# Patient Record
Sex: FEMALE | Race: WHITE | NOT HISPANIC OR LATINO | URBAN - METROPOLITAN AREA
[De-identification: names, ages, dates, MRNs, and addresses within clinical notes are randomized per-mention and may not be internally consistent; named-entity substitution may affect disease eponyms.]

---

## 2019-01-28 ENCOUNTER — AMBULATORY - RIVER FALLS (OUTPATIENT)
Dept: FAMILY MEDICINE | Facility: CLINIC | Age: 69
End: 2019-01-28

## 2019-01-28 ENCOUNTER — COMMUNICATION - RIVER FALLS (OUTPATIENT)
Dept: FAMILY MEDICINE | Facility: CLINIC | Age: 69
End: 2019-01-28

## 2019-01-28 ENCOUNTER — OFFICE VISIT - RIVER FALLS (OUTPATIENT)
Dept: FAMILY MEDICINE | Facility: CLINIC | Age: 69
End: 2019-01-28

## 2019-01-28 LAB
INR PPP: 4.7
INR PPP: 4.7
PROTHROMBIN TIME: 43.5 S (ref 11.9–13.9)

## 2019-01-28 ASSESSMENT — MIFFLIN-ST. JEOR: SCORE: 1010.29

## 2019-01-29 ENCOUNTER — AMBULATORY - RIVER FALLS (OUTPATIENT)
Dept: FAMILY MEDICINE | Facility: CLINIC | Age: 69
End: 2019-01-29

## 2019-01-29 LAB
INR PPP: 3.2
PROTHROMBIN TIME: 32.1 S (ref 11.9–13.9)

## 2019-02-01 ENCOUNTER — AMBULATORY - RIVER FALLS (OUTPATIENT)
Dept: FAMILY MEDICINE | Facility: CLINIC | Age: 69
End: 2019-02-01

## 2019-02-01 LAB
INR PPP: 2.6
PROTHROMBIN TIME: 27.4 S (ref 11.9–13.9)

## 2019-02-05 ENCOUNTER — AMBULATORY - RIVER FALLS (OUTPATIENT)
Dept: FAMILY MEDICINE | Facility: CLINIC | Age: 69
End: 2019-02-05

## 2019-02-05 LAB — INR PPP: 2.6

## 2019-02-08 ENCOUNTER — AMBULATORY - RIVER FALLS (OUTPATIENT)
Dept: FAMILY MEDICINE | Facility: CLINIC | Age: 69
End: 2019-02-08

## 2019-02-08 LAB — INR PPP: 2.6

## 2019-02-21 ENCOUNTER — AMBULATORY - RIVER FALLS (OUTPATIENT)
Dept: FAMILY MEDICINE | Facility: CLINIC | Age: 69
End: 2019-02-21

## 2019-02-22 ENCOUNTER — AMBULATORY - RIVER FALLS (OUTPATIENT)
Dept: FAMILY MEDICINE | Facility: CLINIC | Age: 69
End: 2019-02-22

## 2019-02-22 LAB — INR PPP: 2

## 2019-02-23 ENCOUNTER — AMBULATORY - RIVER FALLS (OUTPATIENT)
Dept: FAMILY MEDICINE | Facility: CLINIC | Age: 69
End: 2019-02-23

## 2019-03-14 ENCOUNTER — OFFICE VISIT - RIVER FALLS (OUTPATIENT)
Dept: FAMILY MEDICINE | Facility: CLINIC | Age: 69
End: 2019-03-14

## 2019-03-14 LAB
INR PPP: 1.7
PROTHROMBIN TIME: 19.3 S (ref 11.9–13.9)

## 2019-03-14 ASSESSMENT — MIFFLIN-ST. JEOR: SCORE: 1015.73

## 2019-04-04 ENCOUNTER — AMBULATORY - RIVER FALLS (OUTPATIENT)
Dept: FAMILY MEDICINE | Facility: CLINIC | Age: 69
End: 2019-04-04

## 2019-04-04 LAB — INR PPP: 1.4

## 2019-04-18 ENCOUNTER — OFFICE VISIT - RIVER FALLS (OUTPATIENT)
Dept: FAMILY MEDICINE | Facility: CLINIC | Age: 69
End: 2019-04-18

## 2019-04-18 ASSESSMENT — MIFFLIN-ST. JEOR: SCORE: 1016.64

## 2019-05-01 ENCOUNTER — AMBULATORY - HEALTHEAST (OUTPATIENT)
Dept: CARDIOLOGY | Facility: CLINIC | Age: 69
End: 2019-05-01

## 2019-05-01 ENCOUNTER — RECORDS - HEALTHEAST (OUTPATIENT)
Dept: ADMINISTRATIVE | Facility: OTHER | Age: 69
End: 2019-05-01

## 2019-05-21 ENCOUNTER — AMBULATORY - RIVER FALLS (OUTPATIENT)
Dept: FAMILY MEDICINE | Facility: CLINIC | Age: 69
End: 2019-05-21

## 2019-05-21 LAB — INR PPP: 1.1

## 2019-05-23 ENCOUNTER — OFFICE VISIT - RIVER FALLS (OUTPATIENT)
Dept: FAMILY MEDICINE | Facility: CLINIC | Age: 69
End: 2019-05-23

## 2019-05-23 ENCOUNTER — AMBULATORY - RIVER FALLS (OUTPATIENT)
Dept: FAMILY MEDICINE | Facility: CLINIC | Age: 69
End: 2019-05-23

## 2019-05-24 LAB — FECAL FAT, QUALITATIVE: NORMAL

## 2019-05-25 ENCOUNTER — COMMUNICATION - RIVER FALLS (OUTPATIENT)
Dept: FAMILY MEDICINE | Facility: CLINIC | Age: 69
End: 2019-05-25

## 2019-06-06 ENCOUNTER — AMBULATORY - RIVER FALLS (OUTPATIENT)
Dept: FAMILY MEDICINE | Facility: CLINIC | Age: 69
End: 2019-06-06

## 2019-06-06 LAB — INR PPP: 2.1

## 2019-06-21 ENCOUNTER — AMBULATORY - RIVER FALLS (OUTPATIENT)
Dept: FAMILY MEDICINE | Facility: CLINIC | Age: 69
End: 2019-06-21

## 2019-06-21 LAB — INR PPP: 2

## 2019-07-05 ENCOUNTER — AMBULATORY - RIVER FALLS (OUTPATIENT)
Dept: FAMILY MEDICINE | Facility: CLINIC | Age: 69
End: 2019-07-05

## 2019-07-05 LAB — INR PPP: 2.4

## 2019-08-01 ENCOUNTER — AMBULATORY - RIVER FALLS (OUTPATIENT)
Dept: FAMILY MEDICINE | Facility: CLINIC | Age: 69
End: 2019-08-01

## 2019-08-01 LAB — INR PPP: 2.3

## 2019-08-30 ENCOUNTER — AMBULATORY - RIVER FALLS (OUTPATIENT)
Dept: FAMILY MEDICINE | Facility: CLINIC | Age: 69
End: 2019-08-30

## 2019-08-30 LAB
INR PPP: 1.1
PROTHROMBIN TIME: 14.4 S (ref 11.8–14.2)

## 2019-08-31 LAB
CHOLEST SERPL-MCNC: 175 MG/DL
CHOLEST/HDLC SERPL: 2.5 {RATIO}
GLUCOSE BLD-MCNC: 79 MG/DL (ref 65–99)
HDLC SERPL-MCNC: 70 MG/DL
LDLC SERPL CALC-MCNC: 90 MG/DL
NONHDLC SERPL-MCNC: 105 MG/DL
TRIGL SERPL-MCNC: 61 MG/DL

## 2019-09-04 ENCOUNTER — COMMUNICATION - RIVER FALLS (OUTPATIENT)
Dept: FAMILY MEDICINE | Facility: CLINIC | Age: 69
End: 2019-09-04

## 2019-09-06 ENCOUNTER — AMBULATORY - RIVER FALLS (OUTPATIENT)
Dept: FAMILY MEDICINE | Facility: CLINIC | Age: 69
End: 2019-09-06

## 2019-09-06 LAB — INR PPP: 2.6

## 2019-10-02 ENCOUNTER — OFFICE VISIT - RIVER FALLS (OUTPATIENT)
Dept: FAMILY MEDICINE | Facility: CLINIC | Age: 69
End: 2019-10-02

## 2019-10-02 ENCOUNTER — AMBULATORY - RIVER FALLS (OUTPATIENT)
Dept: FAMILY MEDICINE | Facility: CLINIC | Age: 69
End: 2019-10-02

## 2019-10-02 LAB — INR PPP: 3.6

## 2019-10-17 ENCOUNTER — AMBULATORY - RIVER FALLS (OUTPATIENT)
Dept: FAMILY MEDICINE | Facility: CLINIC | Age: 69
End: 2019-10-17

## 2019-10-17 LAB — INR PPP: 1.8

## 2019-10-31 ENCOUNTER — AMBULATORY - RIVER FALLS (OUTPATIENT)
Dept: FAMILY MEDICINE | Facility: CLINIC | Age: 69
End: 2019-10-31

## 2019-10-31 LAB — INR PPP: 2

## 2019-11-26 ENCOUNTER — AMBULATORY - RIVER FALLS (OUTPATIENT)
Dept: FAMILY MEDICINE | Facility: CLINIC | Age: 69
End: 2019-11-26

## 2019-11-26 LAB — INR PPP: 2.5

## 2019-12-23 ENCOUNTER — AMBULATORY - RIVER FALLS (OUTPATIENT)
Dept: FAMILY MEDICINE | Facility: CLINIC | Age: 69
End: 2019-12-23

## 2019-12-23 LAB — INR PPP: 3.6

## 2019-12-26 ENCOUNTER — OFFICE VISIT - RIVER FALLS (OUTPATIENT)
Dept: FAMILY MEDICINE | Facility: CLINIC | Age: 69
End: 2019-12-26

## 2019-12-26 LAB
ANION GAP SERPL CALCULATED.3IONS-SCNC: 8 MMOL/L (ref 5–18)
BASOPHILS # BLD MANUAL: 0 10*3/UL
BASOPHILS NFR BLD MANUAL: 0.3 %
BNP SERPL-MCNC: 246 PG/ML
BUN SERPL-MCNC: 12 MG/DL (ref 8–25)
BUN/CREAT RATIO - HISTORICAL: 16 (ref 10–20)
CALCIUM SERPL-MCNC: 9.1 MG/DL (ref 8.5–10.5)
CHLORIDE BLD-SCNC: 107 MMOL/L (ref 98–110)
CO2 SERPL-SCNC: 26 MMOL/L (ref 21–31)
CREAT SERPL-MCNC: 0.76 MG/DL (ref 0.57–1.11)
EOSINOPHIL # BLD MANUAL: 0.2 10*3/UL
EOSINOPHIL NFR BLD MANUAL: 2.8 %
ERYTHROCYTE [DISTWIDTH] IN BLOOD BY AUTOMATED COUNT: 13.8 % (ref 11.5–15.5)
GFR ESTIMATE EXT - HISTORICAL: >60
GLUCOSE BLD-MCNC: 90 MG/DL (ref 65–100)
HCT VFR BLD AUTO: 40.9 % (ref 33–51)
HGB BLD-MCNC: 13.2 G/DL (ref 12–16)
LYMPHOCYTES # BLD MANUAL: 1.4 10*3/UL (ref 0.9–2.9)
LYMPHOCYTES NFR BLD MANUAL: 20.4 %
MCH RBC QN AUTO: 29.5 PG (ref 26–34)
MCHC RBC AUTO-ENTMCNC: 32.3 G/DL (ref 32–36)
MCV RBC AUTO: 92 FL (ref 80–100)
MONOCYTES # BLD MANUAL: 0.5 10*3/UL
MONOCYTES NFR BLD MANUAL: 7.9 %
NEUTROPHILS # BLD MANUAL: 4.7 10*3/UL (ref 1.7–7)
NEUTROPHILS NFR BLD MANUAL: 68.6 %
PLATELET # BLD AUTO: 270 10*3/UL (ref 140–440)
PMV BLD: 9.3 FL (ref 6.5–11)
POTASSIUM BLD-SCNC: 4.4 MMOL/L (ref 3.5–5)
RBC # BLD AUTO: 4.47 10*6/UL (ref 4–5.2)
SODIUM SERPL-SCNC: 141 MMOL/L (ref 135–145)
TROPONIN I - HISTORICAL: <0.01 NG/ML
WBC # BLD AUTO: 6.8 10*3/UL (ref 4.5–11)

## 2019-12-31 LAB
CREAT SERPL-MCNC: 0.66 MG/DL
HGB BLD-MCNC: 13.2 G/DL
INR PPP: 1.5
POTASSIUM BLD-SCNC: 4.2 MEQ/L
SODIUM SERPL-SCNC: 140 MEQ/L
WBC # BLD AUTO: 6.8 X10

## 2020-01-02 ENCOUNTER — OFFICE VISIT - RIVER FALLS (OUTPATIENT)
Dept: FAMILY MEDICINE | Facility: CLINIC | Age: 70
End: 2020-01-02

## 2020-01-02 ASSESSMENT — MIFFLIN-ST. JEOR: SCORE: 985.79

## 2020-01-04 LAB
BUN SERPL-MCNC: 29 MG/DL (ref 7–25)
BUN/CREAT RATIO - HISTORICAL: 26 (ref 6–22)
CALCIUM SERPL-MCNC: 9.3 MG/DL (ref 8.6–10.4)
CHLORIDE BLD-SCNC: 96 MMOL/L (ref 98–110)
CO2 SERPL-SCNC: 28 MMOL/L (ref 20–32)
CREAT SERPL-MCNC: 1.11 MG/DL (ref 0.6–0.93)
EGFRCR SERPLBLD CKD-EPI 2021: 50 ML/MIN/1.73M2
GLUCOSE BLD-MCNC: 109 MG/DL (ref 65–99)
POTASSIUM BLD-SCNC: 4.6 MMOL/L (ref 3.5–5.3)
SODIUM SERPL-SCNC: 137 MMOL/L (ref 135–146)

## 2020-01-06 ENCOUNTER — AMBULATORY - RIVER FALLS (OUTPATIENT)
Dept: FAMILY MEDICINE | Facility: CLINIC | Age: 70
End: 2020-01-06

## 2020-01-06 ENCOUNTER — COMMUNICATION - RIVER FALLS (OUTPATIENT)
Dept: FAMILY MEDICINE | Facility: CLINIC | Age: 70
End: 2020-01-06

## 2020-01-06 LAB
INR PPP: 4.7
PROTHROMBIN TIME: 43.9 S (ref 11.8–14.2)

## 2020-01-10 ENCOUNTER — OFFICE VISIT - RIVER FALLS (OUTPATIENT)
Dept: FAMILY MEDICINE | Facility: CLINIC | Age: 70
End: 2020-01-10

## 2020-01-10 ENCOUNTER — COMMUNICATION - RIVER FALLS (OUTPATIENT)
Dept: FAMILY MEDICINE | Facility: CLINIC | Age: 70
End: 2020-01-10

## 2020-01-14 ENCOUNTER — AMBULATORY - RIVER FALLS (OUTPATIENT)
Dept: FAMILY MEDICINE | Facility: CLINIC | Age: 70
End: 2020-01-14

## 2020-02-07 ENCOUNTER — OFFICE VISIT - RIVER FALLS (OUTPATIENT)
Dept: FAMILY MEDICINE | Facility: CLINIC | Age: 70
End: 2020-02-07

## 2020-02-07 LAB
ANION GAP SERPL CALCULATED.3IONS-SCNC: 8 MMOL/L (ref 5–18)
BUN SERPL-MCNC: 17 MG/DL (ref 8–25)
BUN/CREAT RATIO - HISTORICAL: 13 (ref 10–20)
CALCIUM SERPL-MCNC: 9.1 MG/DL (ref 8.5–10.5)
CHLORIDE BLD-SCNC: 107 MMOL/L (ref 98–110)
CO2 SERPL-SCNC: 28 MMOL/L (ref 21–31)
CREAT SERPL-MCNC: 1.29 MG/DL (ref 0.57–1.11)
ERYTHROCYTE [DISTWIDTH] IN BLOOD BY AUTOMATED COUNT: 15.2 % (ref 11.5–15.5)
GFR ESTIMATE EXT - HISTORICAL: 41
GLUCOSE BLD-MCNC: 99 MG/DL (ref 65–100)
HCT VFR BLD AUTO: 37.9 % (ref 33–51)
HGB BLD-MCNC: 12 G/DL (ref 12–16)
MCH RBC QN AUTO: 29.9 PG (ref 26–34)
MCHC RBC AUTO-ENTMCNC: 31.7 G/DL (ref 32–36)
MCV RBC AUTO: 94 FL (ref 80–100)
PLATELET # BLD AUTO: 533 10*3/UL (ref 140–440)
PMV BLD: 8.6 FL (ref 6.5–11)
POTASSIUM BLD-SCNC: 4.3 MMOL/L (ref 3.5–5)
RBC # BLD AUTO: 4.02 10*6/UL (ref 4–5.2)
SODIUM SERPL-SCNC: 143 MMOL/L (ref 135–145)
WBC # BLD AUTO: 9.3 10*3/UL (ref 4.5–11)

## 2020-02-07 ASSESSMENT — MIFFLIN-ST. JEOR: SCORE: 1016.64

## 2020-02-13 ENCOUNTER — OFFICE VISIT - RIVER FALLS (OUTPATIENT)
Dept: FAMILY MEDICINE | Facility: CLINIC | Age: 70
End: 2020-02-13

## 2020-02-13 ENCOUNTER — COMMUNICATION - RIVER FALLS (OUTPATIENT)
Dept: FAMILY MEDICINE | Facility: CLINIC | Age: 70
End: 2020-02-13

## 2020-02-13 ASSESSMENT — MIFFLIN-ST. JEOR: SCORE: 996.68

## 2020-05-04 ENCOUNTER — OFFICE VISIT - RIVER FALLS (OUTPATIENT)
Dept: FAMILY MEDICINE | Facility: CLINIC | Age: 70
End: 2020-05-04

## 2020-06-08 ENCOUNTER — OFFICE VISIT - RIVER FALLS (OUTPATIENT)
Dept: FAMILY MEDICINE | Facility: CLINIC | Age: 70
End: 2020-06-08

## 2020-06-08 ASSESSMENT — MIFFLIN-ST. JEOR: SCORE: 1030.25

## 2020-08-04 ENCOUNTER — OFFICE VISIT - RIVER FALLS (OUTPATIENT)
Dept: FAMILY MEDICINE | Facility: CLINIC | Age: 70
End: 2020-08-04

## 2020-08-04 ASSESSMENT — MIFFLIN-ST. JEOR: SCORE: 1078.33

## 2020-08-07 ENCOUNTER — AMBULATORY - RIVER FALLS (OUTPATIENT)
Dept: FAMILY MEDICINE | Facility: CLINIC | Age: 70
End: 2020-08-07

## 2020-08-08 LAB — FECAL FAT, QUALITATIVE: NORMAL

## 2020-08-10 ENCOUNTER — COMMUNICATION - RIVER FALLS (OUTPATIENT)
Dept: FAMILY MEDICINE | Facility: CLINIC | Age: 70
End: 2020-08-10

## 2020-10-30 ENCOUNTER — AMBULATORY - RIVER FALLS (OUTPATIENT)
Dept: FAMILY MEDICINE | Facility: CLINIC | Age: 70
End: 2020-10-30

## 2020-10-30 LAB
INR PPP: 1.5
PROTHROMBIN TIME: 17.6 S (ref 10.5–13.1)

## 2020-11-02 ENCOUNTER — AMBULATORY - RIVER FALLS (OUTPATIENT)
Dept: FAMILY MEDICINE | Facility: CLINIC | Age: 70
End: 2020-11-02

## 2020-11-06 ENCOUNTER — AMBULATORY - RIVER FALLS (OUTPATIENT)
Dept: FAMILY MEDICINE | Facility: CLINIC | Age: 70
End: 2020-11-06

## 2020-11-06 LAB
INR PPP: 2.1
INR PPP: 2.1
PROTHROMBIN TIME: 25.6 S (ref 10.5–13.1)

## 2020-11-09 LAB
INR PPP: 2.5
PROTHROMBIN TIME: 29.7 S (ref 10.5–13.1)

## 2020-11-12 ENCOUNTER — AMBULATORY - RIVER FALLS (OUTPATIENT)
Dept: FAMILY MEDICINE | Facility: CLINIC | Age: 70
End: 2020-11-12

## 2020-11-12 LAB
INR PPP: 3.9
PROTHROMBIN TIME: 47.2 S (ref 10.5–13.1)

## 2020-11-17 ENCOUNTER — AMBULATORY - RIVER FALLS (OUTPATIENT)
Dept: FAMILY MEDICINE | Facility: CLINIC | Age: 70
End: 2020-11-17

## 2020-11-17 ENCOUNTER — COMMUNICATION - RIVER FALLS (OUTPATIENT)
Dept: FAMILY MEDICINE | Facility: CLINIC | Age: 70
End: 2020-11-17

## 2020-11-24 ENCOUNTER — AMBULATORY - RIVER FALLS (OUTPATIENT)
Dept: FAMILY MEDICINE | Facility: CLINIC | Age: 70
End: 2020-11-24

## 2020-11-24 LAB
INR PPP: 2.6
PROTHROMBIN TIME: 31.7 S (ref 10.5–13.1)

## 2020-12-10 ENCOUNTER — AMBULATORY - RIVER FALLS (OUTPATIENT)
Dept: FAMILY MEDICINE | Facility: CLINIC | Age: 70
End: 2020-12-10

## 2020-12-10 LAB
INR PPP: 1.9
PROTHROMBIN TIME: 23.4 S (ref 10.5–13.1)

## 2020-12-15 ENCOUNTER — AMBULATORY - RIVER FALLS (OUTPATIENT)
Dept: FAMILY MEDICINE | Facility: CLINIC | Age: 70
End: 2020-12-15

## 2020-12-15 LAB
INR PPP: 2.3
PROTHROMBIN TIME: 27.3 S (ref 10.5–13.1)

## 2020-12-28 ENCOUNTER — AMBULATORY - RIVER FALLS (OUTPATIENT)
Dept: FAMILY MEDICINE | Facility: CLINIC | Age: 70
End: 2020-12-28

## 2020-12-28 LAB
INR PPP: 2
PROTHROMBIN TIME: 23.7 S (ref 10.5–13.1)

## 2021-02-17 ENCOUNTER — COMMUNICATION - RIVER FALLS (OUTPATIENT)
Dept: FAMILY MEDICINE | Facility: CLINIC | Age: 71
End: 2021-02-17

## 2021-02-26 ENCOUNTER — AMBULATORY - RIVER FALLS (OUTPATIENT)
Dept: FAMILY MEDICINE | Facility: CLINIC | Age: 71
End: 2021-02-26

## 2021-02-26 LAB
INR PPP: 2.1
INR PPP: 2.5
PROTHROMBIN TIME: 23.9 S (ref 12–14.6)

## 2021-02-27 LAB
BUN SERPL-MCNC: 18 MG/DL (ref 7–25)
BUN/CREAT RATIO - HISTORICAL: 18 (ref 6–22)
CALCIUM SERPL-MCNC: 9.5 MG/DL (ref 8.6–10.4)
CHLORIDE BLD-SCNC: 104 MMOL/L (ref 98–110)
CHOLEST SERPL-MCNC: 175 MG/DL
CHOLEST/HDLC SERPL: 2.3 {RATIO}
CO2 SERPL-SCNC: 29 MMOL/L (ref 20–32)
CREAT SERPL-MCNC: 0.99 MG/DL (ref 0.6–0.93)
EGFRCR SERPLBLD CKD-EPI 2021: 57 ML/MIN/1.73M2
ERYTHROCYTE [DISTWIDTH] IN BLOOD BY AUTOMATED COUNT: 12.4 % (ref 11–15)
GLUCOSE BLD-MCNC: 92 MG/DL (ref 65–99)
HCT VFR BLD AUTO: 33.5 % (ref 35–45)
HDLC SERPL-MCNC: 75 MG/DL
HGB BLD-MCNC: 11.2 GM/DL (ref 11.7–15.5)
LDLC SERPL CALC-MCNC: 80 MG/DL
MCH RBC QN AUTO: 30 PG (ref 27–33)
MCHC RBC AUTO-ENTMCNC: 33.4 GM/DL (ref 32–36)
MCV RBC AUTO: 89.8 FL (ref 80–100)
NONHDLC SERPL-MCNC: 100 MG/DL
PLATELET # BLD AUTO: 234 10*3/UL (ref 140–400)
PMV BLD: 9.6 FL (ref 7.5–12.5)
POTASSIUM BLD-SCNC: 4.2 MMOL/L (ref 3.5–5.3)
RBC # BLD AUTO: 3.73 10*6/UL (ref 3.8–5.1)
SODIUM SERPL-SCNC: 141 MMOL/L (ref 135–146)
TRIGL SERPL-MCNC: 102 MG/DL
WBC # BLD AUTO: 6.7 10*3/UL (ref 3.8–10.8)

## 2021-02-28 ENCOUNTER — COMMUNICATION - RIVER FALLS (OUTPATIENT)
Dept: FAMILY MEDICINE | Facility: CLINIC | Age: 71
End: 2021-02-28

## 2021-03-11 ENCOUNTER — AMBULATORY - RIVER FALLS (OUTPATIENT)
Dept: FAMILY MEDICINE | Facility: CLINIC | Age: 71
End: 2021-03-11

## 2021-03-11 LAB
INR PPP: 1.9
PROTHROMBIN TIME: 23.3 S (ref 10.5–13.1)

## 2021-03-29 ENCOUNTER — AMBULATORY - RIVER FALLS (OUTPATIENT)
Dept: FAMILY MEDICINE | Facility: CLINIC | Age: 71
End: 2021-03-29

## 2021-03-29 LAB — INR BLD: 1.8

## 2021-04-21 ENCOUNTER — AMBULATORY - RIVER FALLS (OUTPATIENT)
Dept: FAMILY MEDICINE | Facility: CLINIC | Age: 71
End: 2021-04-21

## 2021-04-21 LAB — INR BLD: 1.9

## 2021-04-27 ENCOUNTER — OFFICE VISIT - RIVER FALLS (OUTPATIENT)
Dept: FAMILY MEDICINE | Facility: CLINIC | Age: 71
End: 2021-04-27

## 2021-04-27 ASSESSMENT — MIFFLIN-ST. JEOR: SCORE: 1079.69

## 2021-05-06 ENCOUNTER — AMBULATORY - RIVER FALLS (OUTPATIENT)
Dept: FAMILY MEDICINE | Facility: CLINIC | Age: 71
End: 2021-05-06

## 2021-05-06 LAB — INR BLD: 1.9

## 2021-05-20 ENCOUNTER — AMBULATORY - RIVER FALLS (OUTPATIENT)
Dept: FAMILY MEDICINE | Facility: CLINIC | Age: 71
End: 2021-05-20

## 2021-05-20 LAB — INR BLD: 2.9

## 2021-07-01 ENCOUNTER — AMBULATORY - RIVER FALLS (OUTPATIENT)
Dept: FAMILY MEDICINE | Facility: CLINIC | Age: 71
End: 2021-07-01

## 2021-07-01 LAB — INR BLD: 4.2

## 2021-07-07 ENCOUNTER — AMBULATORY - RIVER FALLS (OUTPATIENT)
Dept: FAMILY MEDICINE | Facility: CLINIC | Age: 71
End: 2021-07-07

## 2021-07-07 LAB — INR BLD: 1.7

## 2021-08-18 ENCOUNTER — OFFICE VISIT - RIVER FALLS (OUTPATIENT)
Dept: FAMILY MEDICINE | Facility: CLINIC | Age: 71
End: 2021-08-18

## 2021-08-18 LAB — INR BLD: 3

## 2021-09-08 ENCOUNTER — OFFICE VISIT - RIVER FALLS (OUTPATIENT)
Dept: FAMILY MEDICINE | Facility: CLINIC | Age: 71
End: 2021-09-08

## 2021-09-08 LAB — INR BLD: 3.1

## 2022-02-11 VITALS
WEIGHT: 135.9 LBS | OXYGEN SATURATION: 99 % | TEMPERATURE: 97.5 F | BODY MASS INDEX: 25.01 KG/M2 | HEIGHT: 62 IN | HEART RATE: 74 BPM | DIASTOLIC BLOOD PRESSURE: 82 MMHG | SYSTOLIC BLOOD PRESSURE: 126 MMHG

## 2022-02-12 VITALS
HEIGHT: 62 IN | BODY MASS INDEX: 23 KG/M2 | DIASTOLIC BLOOD PRESSURE: 68 MMHG | HEART RATE: 62 BPM | WEIGHT: 125 LBS | HEIGHT: 62 IN | OXYGEN SATURATION: 97 % | SYSTOLIC BLOOD PRESSURE: 128 MMHG | WEIGHT: 135.6 LBS | OXYGEN SATURATION: 96 % | TEMPERATURE: 98.6 F | BODY MASS INDEX: 24.95 KG/M2 | HEART RATE: 70 BPM | TEMPERATURE: 98 F | SYSTOLIC BLOOD PRESSURE: 104 MMHG | DIASTOLIC BLOOD PRESSURE: 80 MMHG

## 2022-02-12 VITALS
SYSTOLIC BLOOD PRESSURE: 130 MMHG | DIASTOLIC BLOOD PRESSURE: 74 MMHG | SYSTOLIC BLOOD PRESSURE: 136 MMHG | BODY MASS INDEX: 22.45 KG/M2 | DIASTOLIC BLOOD PRESSURE: 88 MMHG | WEIGHT: 122 LBS | DIASTOLIC BLOOD PRESSURE: 76 MMHG | WEIGHT: 121.8 LBS | HEART RATE: 62 BPM | BODY MASS INDEX: 22.41 KG/M2 | SYSTOLIC BLOOD PRESSURE: 136 MMHG | HEART RATE: 64 BPM | TEMPERATURE: 98.7 F | HEIGHT: 62 IN | HEIGHT: 62 IN | TEMPERATURE: 97.3 F

## 2022-02-12 VITALS
BODY MASS INDEX: 23.45 KG/M2 | DIASTOLIC BLOOD PRESSURE: 68 MMHG | SYSTOLIC BLOOD PRESSURE: 106 MMHG | HEIGHT: 62 IN | HEART RATE: 83 BPM | HEART RATE: 96 BPM | TEMPERATURE: 100.4 F | HEART RATE: 86 BPM | SYSTOLIC BLOOD PRESSURE: 98 MMHG | OXYGEN SATURATION: 95 % | DIASTOLIC BLOOD PRESSURE: 60 MMHG | TEMPERATURE: 99 F | TEMPERATURE: 99.5 F | WEIGHT: 128.2 LBS | OXYGEN SATURATION: 97 % | BODY MASS INDEX: 21.07 KG/M2 | HEART RATE: 80 BPM | HEART RATE: 84 BPM | HEART RATE: 79 BPM | OXYGEN SATURATION: 96 % | HEIGHT: 62 IN | WEIGHT: 117.6 LBS | RESPIRATION RATE: 22 BRPM | OXYGEN SATURATION: 99 % | BODY MASS INDEX: 21.64 KG/M2 | SYSTOLIC BLOOD PRESSURE: 104 MMHG | OXYGEN SATURATION: 95 % | TEMPERATURE: 98.8 F | SYSTOLIC BLOOD PRESSURE: 94 MMHG | WEIGHT: 122 LBS | DIASTOLIC BLOOD PRESSURE: 80 MMHG | SYSTOLIC BLOOD PRESSURE: 112 MMHG | HEIGHT: 62 IN | TEMPERATURE: 99.4 F | BODY MASS INDEX: 21.2 KG/M2 | SYSTOLIC BLOOD PRESSURE: 98 MMHG | WEIGHT: 115.2 LBS | OXYGEN SATURATION: 96 % | HEIGHT: 62 IN | DIASTOLIC BLOOD PRESSURE: 70 MMHG | DIASTOLIC BLOOD PRESSURE: 70 MMHG | RESPIRATION RATE: 24 BRPM | TEMPERATURE: 99.1 F | DIASTOLIC BLOOD PRESSURE: 80 MMHG | DIASTOLIC BLOOD PRESSURE: 58 MMHG | RESPIRATION RATE: 26 BRPM | BODY MASS INDEX: 21.07 KG/M2 | BODY MASS INDEX: 22.45 KG/M2 | SYSTOLIC BLOOD PRESSURE: 106 MMHG | HEIGHT: 62 IN

## 2022-02-12 VITALS
DIASTOLIC BLOOD PRESSURE: 82 MMHG | SYSTOLIC BLOOD PRESSURE: 136 MMHG | DIASTOLIC BLOOD PRESSURE: 74 MMHG | SYSTOLIC BLOOD PRESSURE: 116 MMHG

## 2022-02-12 VITALS — SYSTOLIC BLOOD PRESSURE: 108 MMHG | DIASTOLIC BLOOD PRESSURE: 62 MMHG

## 2022-02-12 VITALS
HEIGHT: 62 IN | BODY MASS INDEX: 22.19 KG/M2 | HEART RATE: 72 BPM | WEIGHT: 120.6 LBS | DIASTOLIC BLOOD PRESSURE: 88 MMHG | SYSTOLIC BLOOD PRESSURE: 128 MMHG | TEMPERATURE: 99.3 F

## 2022-02-15 NOTE — PROGRESS NOTES
History of Present Illness      Called patient to come in today for results of CT scan which was done this morning.  She is here with her sister, Christin.      She is still having some shortness of breath. Not worsening. No chest pain.  No abdominal pain.      CT results from this morning:      Bilateral acute PE with some pulmonary infarction      16cm pelvic mass which appears to be of ovarian or uterine origin.  ascites.      Pt is currently taking warfarin 2.5mg daily.  Four days ago her INR was 4.7  Review of Systems      Negative except as listed in HPI.  Physical Exam      vitals noted.  normal.  O2 sat 97% RA      patient alert, oriented and in no acute distress  Assessment/Plan       Pelvic mass in female (R19.00)         will need GYN consult       Pulmonary embolism, bilateral (I26.99)         currently anticoagulated with warfarin        INR four days ago was 4.7        will need change to lovenox        recommended inpatient treatment - direct admission to [facility].        Accepting hospitalist Dr Viridiana Weathers I spoke with her directly and also gave report to the accepting nurse        declined ambulance transfer.  Sister, Christin, to drive her there  Patient Information     Name:MARNI SANTANA      Address:      [address] [place] [id number]     Sex:Female     YOB: 1950     [phone]     Emergency Contact:ABBIE MENDEZ     MRN:[id number]     FIN:[id number]     Location:[facility]     Date of Service:01/10/2020      Primary Care Physician:       Kirk Agarwal MD, [phone]  Problem List/Past Medical History    Ongoing     Atrial fibrillation     Hypertension     Warfarin anticoagulation    Historical     No qualifying data  Procedure/Surgical History     Thoracentesis - Right pleural effusion (12/26/2019)           Breast implant        Medications    furosemide 40 mg oral tablet, 40 mg= 1 tab(s), Oral, daily,   Not  taking    metoprolol succinate 50 mg oral capsule, extended release, 50 mg= 1 cap(s), Oral, daily    warfarin 5 mg oral tablet, 2.5 mg= 0.5 tab(s), Oral, daily  Allergies    penicillin (bumps on skin)  Social History    Smoking Status - 01/02/2020     Never smoker     Employment/School      Employed, 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 Lives with Self., 01/28/2019     Substance Abuse      Never, 01/28/2019     Tobacco      Never (less than 100 in lifetime), 01/28/2019  Family History    Atrial fibrillation: Brother.    HTN - Hypertension: Father.  Lab Results       Lab Results (Last 4 results within 90 days)        Sodium Level: 137 mmol/L [135 mmol/L - 146 mmol/L] (01/02/20 16:58:00)       Sodium Level: 141 [135 mmol/L - 145 mmol/L] (12/26/19 09:30:00)       Sodium Level TR: 140 mEq/L (12/27/19 06:06:00)       Potassium Level: 4.6 mmol/L [3.5 mmol/L - 5.3 mmol/L] (01/02/20 16:58:00)       Potassium Level: 4.4 [3.5 mmol/L - 5 mmol/L] (12/26/19 09:30:00)       Potassium Level TR: 4.2 mEq/L (12/27/19 06:06:00)       Chloride Level: 96 mmol/L Low [98 mmol/L - 110 mmol/L] (01/02/20 16:58:00)       Chloride Level: 107 [98 mmol/L - 110 mmol/L] (12/26/19 09:30:00)       CO2 Level: 28 mmol/L [20 mmol/L - 32 mmol/L] (01/02/20 16:58:00)       CO2 Level: 26 [21 mmol/L - 31 mmol/L] (12/26/19 09:30:00)       AGAP: 8 [5  - 18] (12/26/19 09:30:00)       Glucose Level: 109 mg/dL High [65 mg/dL - 99 mg/dL] (01/02/20 16:58:00)       Glucose Level: 90 [65 mg/dL - 100 mg/dL] (12/26/19 09:30:00)       BUN: 29 mg/dL High [7 mg/dL - 25 mg/dL] (01/02/20 16:58:00)       BUN: 12 [8 mg/dL - 25 mg/dL] (12/26/19 09:30:00)       Creatinine Level: 1.11 mg/dL High [0.6 mg/dL - 0.93 mg/dL] (01/02/20 16:58:00)       Creatinine Level: 0.76 [0.57 mg/dL - 1.11 mg/dL] (12/26/19 09:30:00)       Creatinine TR: 0.66  mg/dL (12/27/19 06:06:00)       BUN/Creat Ratio: 26 High [6  - 22] (01/02/20 16:58:00)       BUN/Creat Ratio: 16 [10  - 20] (12/26/19 09:30:00)       eGFR: 50 mL/min/1.73m2 Low (01/02/20 16:58:00)       eGFR : 58 mL/min/1.73m2 Low (01/02/20 16:58:00)       eGFR : >60 (12/26/19 09:30:00)       eGFR Non-: >60 (12/26/19 09:30:00)       Calcium Level: 9.3 mg/dL [8.6 mg/dL - 10.4 mg/dL] (01/02/20 16:58:00)       Calcium Level: 9.1 [8.5 mg/dL - 10.5 mg/dL] (12/26/19 09:30:00)       Troponin I: <0.010 (12/26/19 09:30:00)       B-Natriuretic Peptide (BNP): 246 High (12/26/19 09:30:00)       WBC: 6.8 [4.5  - 11] (12/26/19 09:30:00)       WBC TR: 6.8 x10^3/uL (12/26/19 09:30:00)       RBC: 4.47 [4  - 5.2] (12/26/19 09:30:00)       Hgb: 13.2 [12 g/dL - 16 g/dL] (12/26/19 09:30:00)       Hgb TR: 13.2 g/dL (12/26/19 09:30:00)       Hct: 40.9 [33 % - 51 %] (12/26/19 09:30:00)       MCV: 92 [80 fL - 100 fL] (12/26/19 09:30:00)       MCH: 29.5 [26 pg - 34 pg] (12/26/19 09:30:00)       MCHC: 32.3 [32 g/dL - 36 g/dL] (12/26/19 09:30:00)       RDW: 13.8 [11.5 % - 15.5 %] (12/26/19 09:30:00)       Platelet: 270 [140  - 440] (12/26/19 09:30:00)       MPV: 9.3 [6.5 fL - 11 fL] (12/26/19 09:30:00)       Lymphocytes: 20.4 (12/26/19 09:30:00)       Abs Lymphocytes: 1.4 [0.9  - 2.9] (12/26/19 09:30:00)       Neutrophils: 68.6 (12/26/19 09:30:00)       Abs Neutrophils: 4.7 [1.7  - 7] (12/26/19 09:30:00)       Monocytes: 7.9 (12/26/19 09:30:00)       Abs Monocytes: 0.5 (12/26/19 09:30:00)       Eosinophils: 2.8 (12/26/19 09:30:00)       Abs Eosinophils: 0.2 (12/26/19 09:30:00)       Basophils: 0.3 (12/26/19 09:30:00)       Abs Basophils: 0.0 (12/26/19 09:30:00)       Protime: 43.9 High [11.8  - 14.2] (01/06/20 14:23:00)       INR: 4.7 High (01/06/20 14:23:00)       INR: 3.6 (12/23/19 14:10:00)       INR: 2.5 (11/26/19 14:08:00)       INR: 2 (10/31/19 09:12:00)       INR TR: 1.5 (12/27/19  06:06:00)

## 2022-02-15 NOTE — PROGRESS NOTES
Chief Complaint    f/u BP, discuss medication--brand name vs generic.  INR also today.  History of Present Illness      Patient is here for follow-up on her atrial fibrillation and hypertension.  She denies any symptoms of atrial fibrillation, palpitations, lightheadedness or chest discomfort.  INR has been therapeutic.  She has not been monitoring her blood pressure.  Review of Systems      See HPI.  All other review of systems negative.  Physical Exam   Vitals & Measurements    T: 98.7   F (Tympanic)  HR: 64(Peripheral)  BP: 140/86     HT: 62 in  WT: 121.8 lb  BMI: 22.28       Alert, oriented, no acute distress       Neck is supple without adenopathy, thyromegaly, carotid bruit       Heart has a regular rate and rhythm       Lungs are clear       Cooperative, appropriate affect  Assessment/Plan       Atrial fibrillation (I48.91)         Currently in sinus rhythm        48-hour cardiac monitor revealed no evidence of atrial fibrillation or ectopy        Continue with current medication         Ordered:          PT (Request), Priority: Urgent, Atrial fibrillation  Warfarin anticoagulation                Hypertension (I10)        Continue current dose of metoprolol        monitor BP, goal of 130/80        may need to add drug if not under at goal                 Warfarin anticoagulation (Z79.01)         Most recent INR 2.0        Repeat INR today         Ordered:          PT (Request), Priority: Urgent, Atrial fibrillation  Warfarin anticoagulation                Orders:         Return to Clinic (Request), RFV: CSS only BP check per GTG., Return in 2 weeks  Patient Information     Name:MARNI SANTANA      Address:      [address] [place] [id number]     Sex:Female     YOB: 1950     [phone]     Emergency Contact:DAVIDABBIE ELIAS     MRN:[id number]     FIN:[id number]     Location:[facility]     Date of Service:03/14/2019      Primary Care Physician:       NONE  ,       Attending Physician:       Kirk Agarwal MD, [phone]  Problem List/Past Medical History    Ongoing     Atrial fibrillation     Hypertension     Warfarin anticoagulation    Historical     No qualifying data  Medications        metoprolol succinate 50 mg oral capsule, extended release: 50 mg, 1 cap(s), Oral, daily, 0 Refill(s).        warfarin 5 mg oral tablet: 5 mg, 1 tab(s), Oral, daily, 0 Refill(s).  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 Lives with Self., 01/28/2019     Substance Abuse      Never, 01/28/2019     Tobacco      Never (less than 100 in lifetime), 01/28/2019  Family History    Atrial fibrillation: Brother.    HTN - Hypertension: Father.  Lab Results          Lab Results (Last 4 results within 90 days)           Protime: 27.4 High (02/01/19 11:15:00)          Protime: 32.1 High (01/29/19 13:58:00)          Protime: 43.5 High (01/28/19 15:31:00)          INR: 2 (02/21/19 16:09:00)          INR: 2.6 (02/08/19 09:06:00)          INR: 2.6 (02/05/19 15:06:00)          INR: 2.6 High (02/01/19 11:15:00)

## 2022-02-15 NOTE — PROGRESS NOTES
Chief Complaint    annual px  History of Present Illness      General health status:  good, recovered from chemo for ovarian cancer      Diet:  balanced      Exercise:  occasional      Medical encounters:  oncology, recent removal of IVC filter      Dental exam:  due      Eye exam:  due      Caffeine use:  coffee in am      Tobacco use:  no      Alcohol use:  no      Lipid and diabetes screening:  up to date      Colon cancer screening:  FIT done last year      Mammogram:  up to date      Pap smear:  n/a      Other concerns:  weight management      Chronic disease:  HTN, atrial fib under control, oncology follow up in September, anticoagulated with enoxaparin  Review of Systems          Constitutional:  No fever, No chills, No sweats, No weakness, No fatigue.            Eye:  No recent visual problem.            Ear/Nose/Mouth/Throat:  No decreased hearing, No nasal congestion, No sore throat.            Respiratory:  No shortness of breath, No cough.            Cardiovascular:  Negative, No chest pain, No palpitations, No peripheral edema.            Gastrointestinal:  No nausea, No vomiting, No diarrhea, No constipation, No heartburn.            Genitourinary:  No dysuria, No change in urine stream.            Hematology/Lymphatics:  No bruising tendency, No bleeding tendency.            Endocrine:  No cold intolerance, No heat intolerance.            Immunologic:  Negative.            Musculoskeletal:  No back pain, No neck pain, No joint pain, No muscle pain.            Integumentary:  No rash, No dryness, No skin lesion.            Neurologic:  Alert and oriented X4, No headache.                Psychiatric:  No anxiety, No depression  Physical Exam   Vitals & Measurements    T: 98   F (Tympanic)  HR: 62(Peripheral)  BP: 104/68  SpO2: 96%     HT: 62 in  WT: 135.6 lb  BMI: 24.8           General:  Alert and oriented, No acute distress.            Eye:  Pupils are equal, round and reactive to light, Extraocular  movements are intact, Normal conjunctiva.            HENT:  Normocephalic, Tympanic membranes are clear, Oral mucosa is moist, No pharyngeal erythema, No sinus tenderness.            Neck:  Supple, Non-tender, No carotid bruit, No lymphadenopathy, No thyromegaly.            Respiratory:  Lungs are clear to auscultation, Respirations are non-labored, Breath sounds are equal, No chest wall tenderness.            Cardiovascular:  Normal rate, Regular rhythm, No murmur, No gallop, Normal peripheral perfusion, No edema.            Breast:  No mass, No tenderness, No discharge.            Gastrointestinal:  Soft, Non-tender, Normal bowel sounds, No organomegaly.            Genitourinary:  No costovertebral angle tenderness.            Musculoskeletal:  Normal range of motion, Normal strength, No swelling, No deformity.            Integumentary:  Warm, Dry, No rash.            Neurologic:  Alert, Oriented, Normal sensory, Normal motor function, No focal deficits, Normal deep tendon reflexes.            Psychiatric:  Cooperative, Appropriate mood & affect.    Assessment/Plan       1. Annual physical exam (Z00.00)         discussed diet, activity, preventive measures                2. Atrial fibrillation (I48.0)         controlled, follow up with cardiology                3. PE - Pulmonary embolism (I26.99)         continue with enoxaparin                4. Anticoagulated (Z79.01)         continue with enoxaparin                 5. Osteoporosis (M81.0)         discussed medical treatment         Ordered:          alendronate, = 1 tab(s) ( 35 mg ), Oral, qweek, # 12 tab(s), 3 Refill(s), Type: Maintenance, Pharmacy: [id number], 1 tab(s) Oral qweek, 62, in, 08/04/20 14:04:00 CDT, Height Measured, 135.6, lb, 08/04/20 14:04:00 CDT, Weight Measured, (Ordered)           Patient Information     Name:MARNI SANTANA      Address:      [address] [place] [id number]     Sex:Female     Date of  Birth:1950     [phone]     Emergency Contact:ABBIE MENDEZ     MRN:[id number]     FIN:[id number]     Location:[facility]     Date of Service:08/04/2020      Primary Care Physician:       Kirk Agarwal MD, [phone]      Attending Physician:       Kirk Agarwal MD, [phone]  Problem List/Past Medical History    Ongoing     Anticoagulated     Atrial fibrillation     DVT - Deep vein thrombosis     Hypertension     Osteoporosis     Ovarian cancer     PE - Pulmonary embolism     Warfarin anticoagulation    Historical     Inpatient stay       Comments: [facility], [place] - Blood clots in lung, legs, and pelvic mass.     Inpatient stay       Comments: [facility], [place] - Right pleural effusion.  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     PLND - Pelvic lymph node dissection (01/27/2020)      Comments: Bilateral dissection.   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     Thoracentesis - Right pleural effusion (12/26/2019)     Screening for colon cancer (05/23/2019)      Comments: FIT card:  negative      Recommendation:  f/u 1yr.     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 Lives with Self., 01/28/2019     Substance Abuse      Never, 01/28/2019     Tobacco      Never (less than 100 in lifetime), 01/28/2019  Family History    Atrial fibrillation: Brother.    HTN - Hypertension: Father.

## 2022-02-15 NOTE — LETTER
(Inserted Image. Unable to display)   May 03, 2019        MARNI SANTANA  [address]   [place], [place] [id number]        Dear MARNI,      Thank you for selecting [facility] (previously [facility]) for your healthcare needs.    Our records indicate you are due for the following services:     Fasting Lab Tests ~ Please do not eat or drink anything 10 hours prior to your scheduled appointment time.  (Water and any medications that you may need are allowed unless directed otherwise.)    If you had your labs done at another facility or with Direct Access Lab Testing at Critical access hospital, please bring in a copy of the results to your next visit, mail a copy, or drop off a copy of your results to your Healthcare Provider.    To schedule an appointment or if you have further questions, please contact your primary clinic:   [facility]       [phone]   [facility]       [phone]              [facility]     [phone]      Powered by [employer]    Sincerely,    Kirk Agarwal M.D.

## 2022-02-15 NOTE — NURSING NOTE
Anticoagulation Therapy Management Entered On:  7/7/2021 12:13 PM CDT    Performed On:  7/7/2021 12:12 PM CDT by Alexa Phillips RN               Anticoagulation Visit Assessment   Type of Visit - Anticoagulation :   Telephone   Anticoagulation Indication :   Atrial fibrillation   Anticoagulant Duration :   Undetermined   Anticoagulation Medication Verified :   Yes   Alexa Phillips RN - 7/7/2021 12:12 PM CDT   Anticoagulation Patient Assessment Grid   Change in Alcohol Consumption :   No   Change in Diet :   No   Change in Medications :   No   Diarrhea :   No   Rectal Bleeding :   No   Signs of Clotting :   No   Signs of Warfarin Intolerance :   No   Unusual Bleeding, Bruising :   No   Upcoming Procedures :   Yes   (Comment: Colonoscopy 7/22/21 [Alexa Phillips RN - 7/7/2021 12:12 PM CDT] )   Vomiting :   No   Alexa Phillips RN - 7/7/2021 12:12 PM CDT   Patient on Warfarin :   Yes   Alexa Phillips RN - 7/7/2021 12:12 PM CDT   Warfarin   Anticoagulant INR Goal Lower :   2    Anticoagulant INR Goal Upper :   3    Sunday :   5 mg   Monday :   2.5 mg   Tuesday :   5 mg   Wednesday :   2.5 mg   Thursday :   5 mg   Friday :   2.5 mg   Saturday :   5 mg   Total Dose :   27.5 mg   Warfarin Pt Reported Previous Week Dose :    Sun Mon Tues Wed Thurs Fri Sat Weekly Total Dose   Week 1 5 mg 2.5 mg 5 mg 2.5 mg 5 mg 2.5 mg 5 mg 27.5 mg   Week 2  mg  mg  mg  mg  mg  mg  mg  mg   Week 3  mg  mg  mg  mg  mg  mg  mg  mg   Week 4  mg  mg  mg  mg  mg  mg  mg  mg         Patient is taking single or multiple strength tablet(s) :   Single strength tab(s)   One Tab Strength :   5 mg tab   Sunday :   5 mg   Monday :   2.5 mg   Tuesday :   5 mg   Wednesday :   2.5 mg   Thursday :   5 mg   Friday :   2.5 mg   Saturday :   5 mg   Week 1 Total Dose :   27.5 mg   Sunday :   1 tab(s)   Monday :   0.5 tab(s)   Tuesday :   1 tab(s)   Wednesday :   0.5 tab(s)   Thursday :   1 tab(s)   Friday :   0.5 tab(s)   Saturday :   1 tab(s)   Warfarin  Dosing Acknowledgement :   I have reviewed the patient's warfarin dosing schedule and confirmed its accuracy for today's visit.   Patient Instructions :   POC INR = 1.7; per protocol Continue warfarin   2.5mg Mon/ Wed/ Fri  5mg ROW  Recheck 1-2 weeks.  LVM with detailed directions. Also to discuss holding warfarin for colonoscopy in 3 weeks with provider performing pre op physical.     Jacqueline OLVERA, Alexa YOUNG - 7/7/2021 12:12 PM CDT

## 2022-02-15 NOTE — PROGRESS NOTES
Chief Complaint    follow up to [facility] hospital stay 1/21/19--1/25/19 for suicide attempt, HTN, atrial fibrillation.  History of Present Illness      Patient is here to establish care and to follow up from her stay at [facility] for suicidal ideation, HTN and atrial fibrillation.  She was found at her home after overdosing on Diphenhydramine.  She overdosed on medication due to feeling angry.  She was transported to [facility] when she was found.  She feels she did have some heart problems prior to recent hospitalization but never was seen by HCP.  She was found to be in atrial fibrillation and having HTN while in the hospital and is now taking Coumadin 5mg daily and Metoprolol 50mg daily.  She did have an echocardiogram done at [facility] and was relatively normal.  She does have history of epistaxis.  Review of Systems      Constitutional: Negative.      Eye: Negative.      Ear/Nose/Mouth/Throat: Negative.      Respiratory: Negative.      Cardiovascular: Negative.      Gastrointestinal: Negative.      Genitourinary: Negative.      Hematology/Lymphatics: Negative.      Endocrine: Negative.      Immunologic: Negative.      Musculoskeletal: Negative.      Integumentary: Negative.      Neurologic: Negative.      Psychiatric: Negative.      All other systems reviewed and negative         Physical Exam   Vitals & Measurements    T: 99.3   F (Tympanic)  HR: 72(Peripheral)  BP: 128/88     HT: 62 in  WT: 120.6 lb  BMI: 22.06           General:  Alert and oriented, No acute distress.            Eye:  Pupils are equal, round and reactive to light, Normal conjunctiva.            HENT:  Oral mucosa is moist.            Neck:  Supple.            Respiratory:  Respirations are non-labored.            Cardiovascular:  Normal rate, Regular rhythm, No edema.  No atrial fibrillation noted on exam.           Gastrointestinal:  Non-distended.            Musculoskeletal:  Normal gait.            Integumentary:  Warm, No rash.             Psychiatric:  Cooperative, Appropriate mood & affect, Normal judgment.             Assessment/Plan       1. Atrial fibrillation (I48.91)       2. Hypertension (I10)         Hospital discharge notes reviewed with patient.  Discussed effects of Metoprolol on HR and atrial fibrillation.   Will get set up with cardiologist to discuss possible cardioversion.  Will have her do 24hr Holter monitor and will help her get set up.  Stay on current medications.  She will follow up when results become available.       3. H/O suicide attempt (Z91.5)         Will get her set up for counseling.  A handout was given to patient to contact counseling services.  Forms were filled out for work, return to work 2/2/19 with no restrictions.      INan MA, acted solely as a scribe for, and in the presence of Dr. Kirk Agarwal who performed the services.             I, Kirk Agarwal MD, personally performed the services described in this documentation.  The documentation was scribed in my presence and is both accurate and complete.                Patient Information     Name:MARNI SANTANA      Address:      [address] [place] [id number]     Sex:Female     YOB: 1950     [phone]     Emergency Contact:ABBIE MENDEZ     MRN:[id number]     FIN:[id number]     Location:[facility]     Date of Service:01/28/2019      Primary Care Physician:       SKYE       Attending Physician:       Kirk Agarwal MD, [phone]  Problem List/Past Medical History    Ongoing     Atrial fibrillation     Hypertension    Historical     No qualifying data  Medications     metoprolol succinate 50 mg oral capsule, extended release: 50 mg, 1 cap(s), Oral, daily, 0 Refill(s).     warfarin 5 mg oral tablet: 5 mg, 1 tab(s), Oral, daily, 0 Refill(s).          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 Lives with Self., 01/28/2019     Substance Abuse      Never, 01/28/2019     Tobacco      Never (less than 100 in lifetime), 01/28/2019  Family History    Atrial fibrillation: Brother.    HTN - Hypertension: Father.  Lab Results       Lab Results (Last 4 results within 90 days)        Protime: 43.5 High (01/28/19 15:31:00)       INR: 4.7 High (01/28/19 15:31:00)

## 2022-02-15 NOTE — TELEPHONE ENCOUNTER
Order sent to [facility] through EMR, they will contact patient to schedule. Patient is aware.Per patient per [facility] they are out of network for patient. Orders brought to [facility]/, patient is aware they will contact her to schedule.

## 2022-02-15 NOTE — NURSING NOTE
PT/INR POC Entered On:  7/1/2021 11:23 AM CDT    Performed On:  7/1/2021 11:22 AM CDT by ABRAM SINCLAIR               PT/INR POC   INR POC :   4.2    Reference Range - INR POC :   2.0-3.0   ABRAM SINCLAIR - 7/1/2021 11:22 AM CDT   Details   Collection Date :   7/1/2021 11:22 AM CDT   Expiration Date :   4/30/2022 CDT   Lot#/Manufacture :   [id number]   Handling Specimen POC :   Capillary    :   ABRAM Rivera - 7/1/2021 11:22 AM CDT

## 2022-02-15 NOTE — RESULTS
Anticoagulation Therapy Entered On:  12/23/2019 2:11 PM CST    Performed On:  12/23/2019 2:10 PM CST by Alexa Phillips RN               Warfarin Management   Week 1 Sunday Dose :   2.5    Week 1 Monday Dose :   2.5    Week 1 Tuesday Dose :   2.5    Week 1 Wednesday Dose :   2.5    Week 1 Thursday Dose :   5    Week 1 Friday Dose :   2.5    Week 1 Saturday Dose :   5    Week 1 Dose Total :   22.5    Week 2 Sunday Dose :   2.5    Week 2 Monday Dose :   2.5    Week 2 Tuesday Dose :   2.5    Week 2 Wednesday Dose :   2.5    Week 2 Thursday Dose :   5    Week 2 Friday Dose :   2.5    Week 2 Saturday Dose :   5    Week 2 Dose Total :   22.5    Planned Duration :   Indefinite   Indication :   Atrial fibrillation   Warfarin Management Comments :   Lab test performed by:  [facility]  [address] [place] [address]  Phone # [phone]  Fax# [phone]  Capillary   International Normalization Ratio :   3.6    INR Range :   2.0 - 3.0   INR Therapeutic Range :   No   Warfarin Abnormal Findings :   Cough x1 month   Alexa Phillips RN - 12/23/2019 2:10 PM CST   Warfarin Management and Results Grid   Signs of Thrombolic :   No   Signs of Warfarin Intolerance :   No   Changes in Diet or Alcohol Intake :   No   Changes in Medication or Antibiotics :   No   Unusual Bleeding or Bruising :   No   Rectal Bleeding or Black Stools :   No   Vitamin K Food Handout :   No   Heart Valve Replacement :   No   Alexa Phillips RN - 12/23/2019 2:10 PM CST   Anticoagulation Recheck :   2 weeks   Warfarin Special Instructions :   Per protocol continue warfarin 5 mg Thur/Sat and 2.5 mg ROW; recheck INR in 2 weeks; pt notified in office.   Alexa Phlilips RN - 12/23/2019 2:10 PM CST

## 2022-02-15 NOTE — TELEPHONE ENCOUNTER
---------------------  From: Jacqueline OLVERA, Alexa YOUNG   To: Appointment Pool ([id number]_[facility]); Phone Messages Pool ([id number]_[facility]); INR Pool ( 32224_[facility]);     Sent: 2/8/2019 10:29:13 AM CST  Subject: 24 hour holter      It is ok for patient to schedule a 24 hour holter placement and removal per GTG if/when she calls back.    Thank you!NELLIE w/ Lashae scheduled for 2/20/19Written order is for 3 day holter. I called pt and she is going to cancel appt today and keep the appt she has tomorrow for placement. Will also do INR at that time. She will mail [employer] holter after she removes it.

## 2022-02-15 NOTE — NURSING NOTE
Quick Intake Entered On:  1/6/2020 2:09 PM CST    Performed On:  1/6/2020 2:08 PM CST by Alexa Phillips RN               Summary   Chief Complaint :   Vitals, elevated POC INR   Height Measured :   62 in(Converted to: 5 ft 2 in, 157.48 cm)    Systolic Blood Pressure :   94 mmHg   Diastolic Blood Pressure :   70 mmHg   Mean Arterial Pressure :   78 mmHg   Peripheral Pulse Rate :   86 bpm   BP Site :   Right arm   Pulse Site :   Radial artery   BP Method :   Manual   HR Method :   Manual   Temperature Tympanic :   100.4 DegF(Converted to: 38.0 DegC)    Respiratory Rate :   24 br/min (HI)    Oxygen Saturation :   95 %   Alexa Phillips RN - 1/6/2020 2:08 PM CST

## 2022-02-15 NOTE — LETTER
(Inserted Image. Unable to display)   May 25, 2019        MARNI SANTANA      [address]   [place], [place] [id number]        Dear MARNI,    Thank you for selecting [facility] for your health care needs.  Below you will find the results of your recent test(s) done at our clinic.     The test was negative for blood in the stool.  Please repeat in one year.      Result Name Current Result   Fecal Globin  See comment 5/23/2019       Please contact me or my assistant at [phone] if you have any questions about your results.    Sincerely,        Jann Agarwal MD        What do your labs mean?  Below is a glossary of commonly ordered labs:  LDL   Bad Cholesterol   HDL   Good Cholesterol  AST/ALT   Liver Function   Cr/Creatinine   Kidney Function  Microalbumin   Kidney Function  BUN   Kidney Function  PSA   Prostate    TSH   Thyroid Hormone  HgbA1c   Diabetes Test   Hgb (Hemoglobin)   Red Blood Cells

## 2022-02-15 NOTE — TELEPHONE ENCOUNTER
---------------------  From: Dee Dee Byrd LPN (Phone Messages Pool ([id number]_[facility]))   To: Phone Messages Pool ([id number]_[place] [facility]);     Sent: 2/15/2019 9:55:50 AM CST  Subject: INR     Phone Message    PCP:   none      Time of Call:  9:23am       Person Calling:  pt  Phone number:  [phone]    Returned call at: 9:52am    Note:   Pt left message stating she was told to continue on warfarin 2.5mg and recheck in 2 weeks Pt says she has an appt scheduled to have a heart monitor placed on 1/20 with removal on 1/21. Pt wondering if she can have INR done on 1/20 or 1/21 or if she needs to wait until the 22nd when she would be due for INR.    Returned call and LM for pt to call back. OK to have INR checked on 1/21 along with removal.    Last office visit and reason:  1/28/19 Hospital follow up with GTGPatient returned call. will plan on having INR done on 2/21/19 when she returns for holter removal.

## 2022-02-15 NOTE — LETTER
(Inserted Image. Unable to display)   February 28, 2021        MARNI SANTANA      [address]   [place], [place] [id number]        Dear MARNI,    Thank you for selecting [facility] for your health care needs.  Below you will find the results of your recent test(s) done at our clinic.     Your tests look good.  We will discuss the results at your upcoming visit.      Result Name Current Result Previous Result Reference Range   Sodium Level (mmol/L)  141 2/26/2021  143 2/7/2020 135 - 146   Potassium Level (mmol/L)  4.2 2/26/2021  4.3 2/7/2020 3.5 - 5.3   Chloride Level (mmol/L)  104 2/26/2021  107 2/7/2020 98 - 110   CO2 Level (mmol/L)  29 2/26/2021  28 2/7/2020 20 - 32   Glucose Level (mg/dL)  92 2/26/2021  99 2/7/2020 65 - 99   BUN (mg/dL)  18 2/26/2021  17 2/7/2020 7 - 25   Creatinine Level (mg/dL) ((H)) 0.99 2/26/2021 ((H)) 1.29 2/7/2020 0.60 - 0.93   BUN/Creat Ratio  18 2/26/2021  13 2/7/2020 6 - 22   eGFR (mL/min/1.73m2) ((L)) 57 2/26/2021 ((L)) 50 1/2/2020 > OR = 60 -    Calcium Level (mg/dL)  9.5 2/26/2021  9.1 2/7/2020 8.6 - 10.4   Cholesterol (mg/dL)  175 2/26/2021  175 8/30/2019  - <200   Non-HDL Cholesterol  100 2/26/2021  105 8/30/2019  - <130   HDL (mg/dL)  75 2/26/2021  70 8/30/2019 > OR = 50 -    Cholesterol/HDL Ratio  2.3 2/26/2021  2.5 8/30/2019  - <5.0   LDL  80 2/26/2021  90 8/30/2019    Triglyceride (mg/dL)  102 2/26/2021  61 8/30/2019  - <150   WBC  6.7 2/26/2021  9.3 2/7/2020 3.8 - 10.8   RBC ((L)) 3.73 2/26/2021  4.02 2/7/2020 3.80 - 5.10   Hgb (gm/dL) ((L)) 11.2 2/26/2021  12.0 2/7/2020 11.7 - 15.5   Hct (%) ((L)) 33.5 2/26/2021  37.9 2/7/2020 35.0 - 45.0   MCV (fL)  89.8 2/26/2021  94 2/7/2020 80.0 - 100.0   MCH (pg)  30.0 2/26/2021  29.9 2/7/2020 27.0 - 33.0   MCHC (gm/dL)  33.4 2/26/2021 ((L)) 31.7 2/7/2020 32.0 - 36.0   RDW (%)  12.4 2/26/2021  15.2 2/7/2020 11.0 - 15.0   Platelet  234 2/26/2021 ((H)) 533 2/7/2020 140 - 400   MPV (fL)  9.6 2/26/2021  8.6 2/7/2020 7.5 -  12.5   INR ((H)) 2.1 2/26/2021 ((H)) 2.0 12/28/2020  - <1.3       Please contact me or my assistant at [phone] if you have any questions about your results.    Sincerely,        Jann Agarwal MD        What do your labs mean?  Below is a glossary of commonly ordered labs:  LDL   Bad Cholesterol   HDL   Good Cholesterol  AST/ALT   Liver Function   Cr/Creatinine   Kidney Function  Microalbumin   Kidney Function  BUN   Kidney Function  PSA   Prostate    TSH   Thyroid Hormone  HgbA1c   Diabetes Test   Hgb (Hemoglobin)   Red Blood Cells

## 2022-02-15 NOTE — PROGRESS NOTES
Chief Complaint    post hospital follow up--ovarian cancer, pneumonia (?), DVT.  was put on metoprolol, Lasix x30 days, Lovenox, no longer taking Warfarin.  has cardio appt w/ cardio next Tuesday.  History of Present Illness      Here for follow up on recent hospitalization for ovarian cancer and PE.  She is doing well post-op.  She is on enoxaparin and has an IVC.  She is seeing oncology, pulmonology and cardiology.      Discharge summary reviewed.  Review of Systems          ROS reviewed an negative except for symptoms noted in HPI.            Physical Exam   Vitals & Measurements    T: 99.4   F (Tympanic)  HR: 79(Peripheral)  BP: 98/60  SpO2: 96%     HT: 62 in  WT: 117.6 lb  BMI: 21.51           General:  Alert and oriented, No acute distress.            Eye:  Normal conjunctiva.            HENT:  Normocephalic          Neck:  Supple, Non-tender, No lymphadenopathy.            Respiratory:  Lungs have decreased BS, Respirations are non-labored.            Cardiovascular:  Normal rate, Regular rhythm.            Integumentary:  Warm, Dry.            Psychiatric:  Cooperative, Appropriate mood & affect.   Assessment/Plan       1. Atrial fibrillation (I48.91)         controlled, follow up with cardiology latter today                2. Ovarian cancer (C56.9)         s/p surgery, doing well                3. PE - Pulmonary embolism (I26.99)         IVC filter in place, on enoxaparin                4. DVT - Deep vein thrombosis (I82.409)         on enoxaparin           Patient Information     Name:MARNI SANTANA      Address:      [address] [place] [id number]     Sex:Female     YOB: 1950     [phone]     Emergency Contact:ABBIE MENDEZ     MRN:[id number]     FIN:[id number]     Location:[facility]     Date of Service:02/13/2020      Primary Care Physician:       Kirk Agarwal MD, [phone]      Attending Physician:       Kirk Agarwal MD, [phone]  Problem List/Past Medical History    Ongoing     Atrial fibrillation     DVT - Deep vein thrombosis     Hypertension     Ovarian cancer     PE - Pulmonary embolism     Warfarin anticoagulation    Historical     Inpatient stay       Comments: [facility] [place] - Blood clots in lung, legs, and pelvic mass.  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     PLND - Pelvic lymph node dissection (01/27/2020)      Comments: Bilateral dissection.   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     Thoracentesis - Right pleural effusion (12/26/2019)     Breast implant  Medications    Daily Multiple Vitamins, Oral, daily    enoxaparin 60 mg/0.6 mL injectable solution, Subcutaneous, q12 hrs    furosemide 40 mg oral tablet, 40 mg= 1 tab(s), Oral, daily    metoprolol succinate 50 mg oral capsule, extended release, 50 mg= 1 cap(s), Oral, daily    oxyCODONE 5 mg oral tablet, 5 mg= 1 tab(s), Oral, q4 hrs, PRN,   Not taking: has rx at home.    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 Lives with  Self., 01/28/2019     Substance Abuse      Never, 01/28/2019     Tobacco      Never (less than 100 in lifetime), 01/28/2019  Family History    Atrial fibrillation: Brother.    HTN - Hypertension: Father.  Lab Results       Lab Results (Last 4 results within 90 days)        Sodium Level: 143 [135 mmol/L - 145 mmol/L] (02/07/20 14:36:00)       Sodium Level: 137 mmol/L [135 mmol/L - 146 mmol/L] (01/02/20 16:58:00)       Sodium Level: 141 [135 mmol/L - 145 mmol/L] (12/26/19 09:30:00)       Sodium Level TR: 140 mEq/L (12/27/19 06:06:00)       Potassium Level: 4.3 [3.5 mmol/L - 5 mmol/L] (02/07/20 14:36:00)       Potassium Level: 4.6 mmol/L [3.5 mmol/L - 5.3 mmol/L] (01/02/20 16:58:00)       Potassium Level: 4.4 [3.5 mmol/L - 5 mmol/L] (12/26/19 09:30:00)       Potassium Level TR: 4.2 mEq/L (12/27/19 06:06:00)       Chloride Level: 107 [98 mmol/L - 110 mmol/L] (02/07/20 14:36:00)       Chloride Level: 96 mmol/L Low [98 mmol/L - 110 mmol/L] (01/02/20 16:58:00)       Chloride Level: 107 [98 mmol/L - 110 mmol/L] (12/26/19 09:30:00)       CO2 Level: 28 [21 mmol/L - 31 mmol/L] (02/07/20 14:36:00)       CO2 Level: 28 mmol/L [20 mmol/L - 32 mmol/L] (01/02/20 16:58:00)       CO2 Level: 26 [21 mmol/L - 31 mmol/L] (12/26/19 09:30:00)       AGAP: 8 [5  - 18] (02/07/20 14:36:00)       AGAP: 8 [5  - 18] (12/26/19 09:30:00)       Glucose Level: 99 [65 mg/dL - 100 mg/dL] (02/07/20 14:36:00)       Glucose Level: 109 mg/dL High [65 mg/dL - 99 mg/dL] (01/02/20 16:58:00)       Glucose Level: 90 [65 mg/dL - 100 mg/dL] (12/26/19 09:30:00)       BUN: 17 [8 mg/dL - 25 mg/dL] (02/07/20 14:36:00)       BUN: 29 mg/dL High [7 mg/dL - 25 mg/dL] (01/02/20 16:58:00)       BUN: 12 [8 mg/dL - 25 mg/dL] (12/26/19 09:30:00)       Creatinine Level: 1.29 High [0.57 mg/dL - 1.11 mg/dL] (02/07/20 14:36:00)       Creatinine Level: 1.11 mg/dL High [0.6 mg/dL - 0.93 mg/dL] (01/02/20 16:58:00)       Creatinine Level: 0.76 [0.57 mg/dL - 1.11 mg/dL] (12/26/19  09:30:00)       Creatinine TR: 0.66 mg/dL (12/27/19 06:06:00)       BUN/Creat Ratio: 13 [10  - 20] (02/07/20 14:36:00)       BUN/Creat Ratio: 26 High [6  - 22] (01/02/20 16:58:00)       BUN/Creat Ratio: 16 [10  - 20] (12/26/19 09:30:00)       eGFR: 50 mL/min/1.73m2 Low (01/02/20 16:58:00)       eGFR : 50 Low (02/07/20 14:36:00)       eGFR : 58 mL/min/1.73m2 Low (01/02/20 16:58:00)       eGFR : >60 (12/26/19 09:30:00)       eGFR Non-: 41 Low (02/07/20 14:36:00)       eGFR Non-: >60 (12/26/19 09:30:00)       Calcium Level: 9.1 [8.5 mg/dL - 10.5 mg/dL] (02/07/20 14:36:00)       Calcium Level: 9.3 mg/dL [8.6 mg/dL - 10.4 mg/dL] (01/02/20 16:58:00)       Calcium Level: 9.1 [8.5 mg/dL - 10.5 mg/dL] (12/26/19 09:30:00)       Troponin I: <0.010 (12/26/19 09:30:00)       B-Natriuretic Peptide (BNP): 246 High (12/26/19 09:30:00)       WBC: 9.3 [4.5  - 11] (02/07/20 14:36:00)       WBC: 6.8 [4.5  - 11] (12/26/19 09:30:00)       WBC TR: 6.8 x10^3/uL (12/26/19 09:30:00)       RBC: 4.02 [4  - 5.2] (02/07/20 14:36:00)       RBC: 4.47 [4  - 5.2] (12/26/19 09:30:00)       Hgb: 12.0 [12 g/dL - 16 g/dL] (02/07/20 14:36:00)       Hgb: 13.2 [12 g/dL - 16 g/dL] (12/26/19 09:30:00)       Hgb TR: 13.2 g/dL (12/26/19 09:30:00)       Hct: 37.9 [33 % - 51 %] (02/07/20 14:36:00)       Hct: 40.9 [33 % - 51 %] (12/26/19 09:30:00)       MCV: 94 [80 fL - 100 fL] (02/07/20 14:36:00)       MCV: 92 [80 fL - 100 fL] (12/26/19 09:30:00)       MCH: 29.9 [26 pg - 34 pg] (02/07/20 14:36:00)       MCH: 29.5 [26 pg - 34 pg] (12/26/19 09:30:00)       MCHC: 31.7 Low [32 g/dL - 36 g/dL] (02/07/20 14:36:00)       MCHC: 32.3 [32 g/dL - 36 g/dL] (12/26/19 09:30:00)       RDW: 15.2 [11.5 % - 15.5 %] (02/07/20 14:36:00)       RDW: 13.8 [11.5 % - 15.5 %] (12/26/19 09:30:00)       Platelet: 533 High [140  - 440] (02/07/20 14:36:00)       Platelet: 270 [140  - 440] (12/26/19 09:30:00)        MPV: 8.6 [6.5 fL - 11 fL] (02/07/20 14:36:00)       MPV: 9.3 [6.5 fL - 11 fL] (12/26/19 09:30:00)       Lymphocytes: 20.4 (12/26/19 09:30:00)       Abs Lymphocytes: 1.4 [0.9  - 2.9] (12/26/19 09:30:00)       Neutrophils: 68.6 (12/26/19 09:30:00)       Abs Neutrophils: 4.7 [1.7  - 7] (12/26/19 09:30:00)       Monocytes: 7.9 (12/26/19 09:30:00)       Abs Monocytes: 0.5 (12/26/19 09:30:00)       Eosinophils: 2.8 (12/26/19 09:30:00)       Abs Eosinophils: 0.2 (12/26/19 09:30:00)       Basophils: 0.3 (12/26/19 09:30:00)       Abs Basophils: 0.0 (12/26/19 09:30:00)       Protime: 43.9 High [11.8  - 14.2] (01/06/20 14:23:00)       INR: 4.7 High (01/06/20 14:23:00)       INR: 3.6 (12/23/19 14:10:00)       INR: 2.5 (11/26/19 14:08:00)       INR TR: 1.5 (12/27/19 06:06:00)

## 2022-02-15 NOTE — NURSING NOTE
Comprehensive Intake Entered On:  1/2/2020 3:51 PM CST    Performed On:  1/2/2020 3:44 PM CST by Leona MORALES, Nan               Summary   Chief Complaint :   f/u [facility] stay--pleural effusion.  had thorancentesis done.  still has cough.   Weight Measured :   115.2 lb(Converted to: 115 lb 3 oz, 52.25 kg)    Height Measured :   62 in(Converted to: 5 ft 2 in, 157.48 cm)    Body Mass Index :   21.07 kg/m2   Body Surface Area :   1.51 m2   Systolic Blood Pressure :   106 mmHg   Diastolic Blood Pressure :   70 mmHg   Mean Arterial Pressure :   82 mmHg   Peripheral Pulse Rate :   96 bpm   BP Site :   Left arm   Pulse Site :   Radial artery   BP Method :   Manual   HR Method :   Manual   Temperature Tympanic :   99.5 DegF(Converted to: 37.5 DegC)    Oxygen Saturation :   96 %   Nan Delgadillo MA - 1/2/2020 3:44 PM CST   Health Status   Allergies Verified? :   Yes   Medication History Verified? :   Yes   Medical History Verified? :   Yes   Pre-Visit Planning Status :   Completed   Tobacco Use? :   Never smoker   Nan Delgadillo MA - 1/2/2020 3:44 PM CST   Consents   Consent for Immunization Exchange :   Consent Granted   Consent for Immunizations to Providers :   Consent Granted   Nan Delgadillo MA - 1/2/2020 3:44 PM CST   Meds / Allergies   (As Of: 1/2/2020 3:51:02 PM CST)   Allergies (Active)   penicillin  Estimated Onset Date:   Unspecified ; Reactions:   bumps on skin ; Created By:   Zahraa Castillo; Reaction Status:   Active ; Category:   Drug ; Substance:   penicillin ; Type:   Allergy ; Updated By:   Zahraa Castillo; Source:   Patient ; Reviewed Date:   4/18/2019 2:06 PM CDT        Medication List   (As Of: 1/2/2020 3:51:02 PM CST)   Home Meds    furosemide  :   furosemide ; Status:   Documented ; Ordered As Mnemonic:   furosemide 40 mg oral tablet ; Simple Display Line:   40 mg, 1 tab(s), Oral, daily, 0 Refill(s) ; Catalog Code:   furosemide ; Order Dt/Tm:   12/31/2019 10:26:55 AM CST          metoprolol  :    metoprolol ; Status:   Documented ; Ordered As Mnemonic:   metoprolol succinate 50 mg oral capsule, extended release ; Simple Display Line:   50 mg, 1 cap(s), Oral, daily, 0 Refill(s) ; Catalog Code:   metoprolol ; Order Dt/Tm:   1/28/2019 2:09:51 PM CST          warfarin  :   warfarin ; Status:   Documented ; Ordered As Mnemonic:   warfarin 5 mg oral tablet ; Simple Display Line:   5 mg, 1 tab(s), Oral, daily, 0 Refill(s) ; Catalog Code:   warfarin ; Order Dt/Tm:   1/28/2019 2:10:03 PM CST            Social History   Social History   (As Of: 1/2/2020 3:51:02 PM CST)   Tobacco:        Never (less than 100 in lifetime)   (Last Updated: 1/28/2019 2:11:09 PM CST by Nan Delgadillo MA)          Substance Abuse:        Never   (Last Updated: 1/28/2019 3:47:03 PM CST by Nan Delgadillo MA)          Employment/School:        Employed, 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   (Last Updated: 1/28/2019 3:47:30 PM CST by Nan Delgadillo MA)          Home/Environment:        Marital status: .  Lives with Self.   (Last Updated: 1/28/2019 3:47:41 PM CST by Nan Delgadillo MA)          Exercise:        Exercise frequency: unknown frequency.   (Last Updated: 1/28/2019 3:48:10 PM CST by Nan Delgadillo MA)

## 2022-02-15 NOTE — NURSING NOTE
Anticoagulation Therapy Management Entered On:  5/6/2021 10:44 AM CDT    Performed On:  5/6/2021 10:39 AM CDT by Cristel Hernandez RN               Anticoagulation Visit Assessment   Anticoagulation Indication :   Atrial fibrillation   Anticoagulant Duration :   Undetermined   Anticoagulation Medication Verified :   Yes   Cristel Hernandez RN - 5/6/2021 10:39 AM CDT   Anticoagulation Patient Assessment Grid   Change in Alcohol Consumption :   No   Change in Diet :   No   Change in Medications :   No   Diarrhea :   No   Rectal Bleeding :   No   Signs of Clotting :   No   Signs of Warfarin Intolerance :   No   Unusual Bleeding, Bruising :   No   Upcoming Procedures :   No   Vomiting :   No   Cristel Hernandez RN - 5/6/2021 10:39 AM CDT   Patient on Warfarin :   Yes   Patient on Other Anticoagulant :   No   Cristel Hernandez RN - 5/6/2021 10:39 AM CDT   Warfarin   Anticoagulant INR Goal Lower :   2    Anticoagulant INR Goal Upper :   3    Information Given by :   Patient   Sunday :   5 mg   Monday :   5 mg   Tuesday :   2.5 mg   Wednesday :   5 mg   Thursday :   2.5 mg   Friday :   5 mg   Saturday :   2.5 mg   Total Dose :   27.5 mg   Warfarin Pt Reported Previous Week Dose :    Sun Mon Tues Wed Thurs Fri Sat Weekly Total Dose   Week 1 5 mg 5 mg 2.5 mg 5 mg 2.5 mg 5 mg 2.5 mg 27.5 mg   Week 2  mg  mg  mg  mg  mg  mg  mg  mg   Week 3  mg  mg  mg  mg  mg  mg  mg  mg   Week 4  mg  mg  mg  mg  mg  mg  mg  mg         Patient is taking single or multiple strength tablet(s) :   Single strength tab(s)   One Tab Strength :   5 mg tab   Sunday :   5 mg   Monday :   5 mg   Tuesday :   2.5 mg   Wednesday :   5 mg   Thursday :   5 mg   Friday :   5 mg   Saturday :   2.5 mg   Week 1 Total Dose :   30 mg   Sunday :   1 tab(s)   Monday :   1 tab(s)   Tuesday :   0.5 tab(s)   Wednesday :   1 tab(s)   Thursday :   1 tab(s)   Friday :   1 tab(s)   Saturday :   0.5 tab(s)   Warfarin Dosing Acknowledgement :   I have reviewed the patient's  warfarin dosing schedule and confirmed its accuracy for today's visit.   Patient Instructions :   INR = 1.9 per POC today. Patient is to increase warfarin to 2.5mg on Tues and Sat and 5mg the rest of the days of the week. Recheck INR in 1 week per protocol. Patient advised via call to cell with message left. Increased warfarin 9 % per week today.     Cristel Hernandez RN - 5/6/2021 10:39 AM CDT   Medication History   Medication List   (As Of: 5/6/2021 10:44:31 AM CDT)   Prescription/Discharge Order    warfarin  :   warfarin ; Status:   Prescribed ; Ordered As Mnemonic:   warfarin 5 mg oral tablet ; Simple Display Line:   5 mg, 1 tab(s), Oral, daily, 30 tab(s), 0 Refill(s) ; Ordering Provider:   Kirk Agarwal MD; Catalog Code:   warfarin ; Order Dt/Tm:   10/30/2020 9:47:39 AM CDT          alendronate  :   alendronate ; Status:   Prescribed ; Ordered As Mnemonic:   alendronate 35 mg oral tablet ; Simple Display Line:   35 mg, 1 tab(s), Oral, qweek, 12 tab(s), 3 Refill(s) ; Ordering Provider:   Kirk Agarwal MD; Catalog Code:   alendronate ; Order Dt/Tm:   8/4/2020 2:58:12 PM CDT          furosemide  :   furosemide ; Status:   Prescribed ; Ordered As Mnemonic:   furosemide 40 mg oral tablet ; Simple Display Line:   40 mg, 1 tab(s), Oral, daily, for 90 day(s), 90 tab(s), 1 Refill(s) ; Ordering Provider:   Kirk Agarwal MD; Catalog Code:   furosemide ; Order Dt/Tm:   5/4/2020 3:53:00 PM CDT            Home Meds    multivitamin  :   multivitamin ; Status:   Documented ; Ordered As Mnemonic:   Daily Multiple Vitamins ; Simple Display Line:   Oral, daily, 0 Refill(s) ; Catalog Code:   multivitamin ; Order Dt/Tm:   2/10/2020 9:18:09 AM CST          metoprolol  :   metoprolol ; Status:   Documented ; Ordered As Mnemonic:   metoprolol succinate 50 mg oral capsule, extended release ; Simple Display Line:   25 mg, 0.5 cap(s), Oral, daily, 0 Refill(s) ; Catalog Code:   metoprolol ; Order Dt/Tm:   1/28/2019 2:09:51  PM CST

## 2022-02-15 NOTE — TELEPHONE ENCOUNTER
---------------------  From: Alexa Phillips RN   Sent: 5/17/2019 8:09:15 AM CDT  Subject: INR reminder     Patient is 29 days past due for INR.  Second letter has been printed from [employer] and sent to [facility] for scanning and mailing.   Call to patient to remind of needing INR as well.

## 2022-02-15 NOTE — NURSING NOTE
Comprehensive Intake Entered On:  3/14/2019 2:08 PM CDT    Performed On:  3/14/2019 2:02 PM CDT by Leona MORALES, Nan               Summary   Chief Complaint :   f/u BP, discuss medication--brand name vs generic.   Weight Measured :   121.8 lb(Converted to: 121 lb 13 oz, 55.25 kg)    Height Measured :   62 in(Converted to: 5 ft 2 in, 157.48 cm)    Body Mass Index :   22.28 kg/m2   Body Surface Area :   1.55 m2   Systolic Blood Pressure :   140 mmHg ([facility])    Diastolic Blood Pressure :   86 mmHg (HI)    Mean Arterial Pressure :   104 mmHg   Peripheral Pulse Rate :   64 bpm   BP Site :   Right arm   Pulse Site :   Radial artery   BP Method :   Manual   HR Method :   Manual   Temperature Tympanic :   98.7 DegF(Converted to: 37.1 DegC)    Nan Delgadillo MA - 3/14/2019 2:02 PM CDT   Health Status   Allergies Verified? :   Yes   Medication History Verified? :   Yes   Medical History Verified? :   Yes   Pre-Visit Planning Status :   Completed   Tobacco Use? :   Never smoker   Nan Delgadillo MA - 3/14/2019 2:02 PM CDT   Consents   Consent for Immunization Exchange :   Consent Granted   Consent for Immunizations to Providers :   Consent Granted   Nan Delgadillo MA - 3/14/2019 2:02 PM CDT   Meds / Allergies   (As Of: 3/14/2019 2:08:28 PM CDT)   Allergies (Active)   penicillin  Estimated Onset Date:   Unspecified ; Reactions:   bumps on skin ; Created By:   Zahraa Castillo; Reaction Status:   Active ; Category:   Drug ; Substance:   penicillin ; Type:   Allergy ; Updated By:   Zahraa Castillo; Source:   Patient ; Reviewed Date:   2/11/2019 11:29 AM CST        Medication List   (As Of: 3/14/2019 2:08:28 PM CDT)   Home Meds    metoprolol  :   metoprolol ; Status:   Documented ; Ordered As Mnemonic:   metoprolol succinate 50 mg oral capsule, extended release ; Simple Display Line:   50 mg, 1 cap(s), Oral, daily, 0 Refill(s) ; Catalog Code:   metoprolol ; Order Dt/Tm:   1/28/2019 2:09:51 PM          warfarin  :   warfarin ; Status:    Documented ; Ordered As Mnemonic:   warfarin 5 mg oral tablet ; Simple Display Line:   5 mg, 1 tab(s), Oral, daily, 0 Refill(s) ; Catalog Code:   warfarin ; Order Dt/Tm:   1/28/2019 2:10:03 PM            Social History   Social History   (As Of: 3/14/2019 2:08:28 PM CDT)   Tobacco:        Never (less than 100 in lifetime)   (Last Updated: 1/28/2019 2:11:09 PM CST by Nan Delgadillo MA)          Substance Abuse:        Never   (Last Updated: 1/28/2019 3:47:03 PM CST by Nan Delgadillo MA)          Employment and Education:        Employed, 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   (Last Updated: 1/28/2019 3:47:30 PM CST by Nan Delgadillo MA)          Home and Environment:        Marital status: .  Lives with Self.   (Last Updated: 1/28/2019 3:47:41 PM CST by Nan Delgadillo MA)          Exercise and Physical Activity:        Exercise frequency: unknown frequency.   (Last Updated: 1/28/2019 3:48:10 PM CST by Nan Delgadillo MA)

## 2022-02-15 NOTE — NURSING NOTE
Comprehensive Intake Entered On:  8/4/2020 2:07 PM CDT    Performed On:  8/4/2020 2:04 PM CDT by Leona MORALES, Nan               Summary   Chief Complaint :   annual px   Weight Measured :   135.6 lb(Converted to: 135 lb 10 oz, 61.51 kg)    Height Measured :   62 in(Converted to: 5 ft 2 in, 157.48 cm)    Body Mass Index :   24.8 kg/m2   Body Surface Area :   1.64 m2   Systolic Blood Pressure :   104 mmHg   Diastolic Blood Pressure :   68 mmHg   Mean Arterial Pressure :   80 mmHg   Peripheral Pulse Rate :   62 bpm   BP Site :   Left arm   Pulse Site :   Radial artery   BP Method :   Manual   HR Method :   Manual   Temperature Tympanic :   98 DegF(Converted to: 36.7 DegC)    Oxygen Saturation :   96 %   Nan Delgadillo MA - 8/4/2020 2:04 PM CDT   Health Status   Allergies Verified? :   Yes   Medication History Verified? :   Yes   Medical History Verified? :   Yes   Pre-Visit Planning Status :   Completed   Tobacco Use? :   Never smoker   Nan Delgadillo MA - 8/4/2020 2:04 PM CDT   Consents   Consent for Immunization Exchange :   Consent Granted   Consent for Immunizations to Providers :   Consent Granted   Nan Delgadillo MA - 8/4/2020 2:04 PM CDT   Meds / Allergies   (As Of: 8/4/2020 2:07:22 PM CDT)   Allergies (Active)   penicillin  Estimated Onset Date:   Unspecified ; Reactions:   bumps on skin ; Created By:   Zahraa Castillo; Reaction Status:   Active ; Category:   Drug ; Substance:   penicillin ; Type:   Allergy ; Updated By:   Zahraa Castillo; Source:   Patient ; Reviewed Date:   2/7/2020 2:11 PM CST        Medication List   (As Of: 8/4/2020 2:07:22 PM CDT)   Prescription/Discharge Order    furosemide  :   furosemide ; Status:   Prescribed ; Ordered As Mnemonic:   furosemide 40 mg oral tablet ; Simple Display Line:   40 mg, 1 tab(s), Oral, daily, for 90 day(s), 90 tab(s), 1 Refill(s) ; Ordering Provider:   Kirk Agarwal MD; Catalog Code:   furosemide ; Order Dt/Tm:   5/4/2020 3:53:00 PM CDT            Home  Meds    enoxaparin  :   enoxaparin ; Status:   Documented ; Ordered As Mnemonic:   enoxaparin 60 mg/0.6 mL injectable solution ; Simple Display Line:   Subcutaneous, q12 hrs, 0 Refill(s) ; Catalog Code:   enoxaparin ; Order Dt/Tm:   2/7/2020 2:05:05 PM CST          metoprolol  :   metoprolol ; Status:   Documented ; Ordered As Mnemonic:   metoprolol succinate 50 mg oral capsule, extended release ; Simple Display Line:   25 mg, 0.5 cap(s), Oral, daily, 0 Refill(s) ; Catalog Code:   metoprolol ; Order Dt/Tm:   1/28/2019 2:09:51 PM CST          multivitamin  :   multivitamin ; Status:   Documented ; Ordered As Mnemonic:   Daily Multiple Vitamins ; Simple Display Line:   Oral, daily, 0 Refill(s) ; Catalog Code:   multivitamin ; Order Dt/Tm:   2/10/2020 9:18:09 AM CST            ID Risk Screen   Recent Travel History :   No recent travel   Family Member Travel History :   No recent travel   Other Exposure to Infectious Disease :   Unknown   Nan Delgadillo MA - 8/4/2020 2:04 PM CDT   Social History   Social History   (As Of: 8/4/2020 2:07:22 PM CDT)   Tobacco:        Never (less than 100 in lifetime)   (Last Updated: 1/28/2019 2:11:09 PM CST by Nan Delgadillo MA)          Substance Abuse:        Never   (Last Updated: 1/28/2019 3:47:03 PM CST by Nan Delgadillo MA)          Employment/School:        Employed, 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   (Last Updated: 1/28/2019 3:47:30 PM CST by Nan Delgadillo MA)          Home/Environment:        Marital status: .  Lives with Self.   (Last Updated: 1/28/2019 3:47:41 PM CST by Nan Delgadillo MA)          Exercise:        Exercise frequency: unknown frequency.   (Last Updated: 1/28/2019 3:48:10 PM CST by Nan Delgadillo MA)

## 2022-02-15 NOTE — RESULTS
Anticoagulation Therapy Entered On:  10/17/2019 8:53 AM CDT    Performed On:  10/17/2019 8:51 AM CDT by Alexa Phillips RN               Warfarin Management   Week 1 Sunday Dose :   2.5    Week 1 Monday Dose :   2.5    Week 1 Tuesday Dose :   2.5    Week 1 Wednesday Dose :   2.5    Week 1 Thursday Dose :   5    Week 1 Friday Dose :   2.5    Week 1 Saturday Dose :   5    Week 1 Dose Total :   22.5    Week 2 Sunday Dose :   2.5    Week 2 Monday Dose :   2.5    Week 2 Tuesday Dose :   2.5    Week 2 Wednesday Dose :   2.5    Week 2 Thursday Dose :   5    Week 2 Friday Dose :   2.5    Week 2 Saturday Dose :   5    Week 2 Dose Total :   22.5    Planned Duration :   Indefinite   Indication :   Atrial fibrillation   Warfarin Management Comments :   Lab test performed by:  [facility]  [address] [place] [address]  Phone # [phone]  Fax# [phone]  Capillary   International Normalization Ratio :   1.8    INR Range :   2.0 - 3.0   INR Therapeutic Range :   No   Warfarin Abnormal Findings :   Patient has been eating salads every day to make up for elevated INR 2 weeks ago. We discussed having a salad every other day now.   Alexa Phillips RN - 10/17/2019 8:52 AM CDT   Warfarin Management and Results Grid   Signs of Thrombolic :   No   Signs of Warfarin Intolerance :   No   Changes in Diet or Alcohol Intake :   Yes   Changes in Medication or Antibiotics :   No   Unusual Bleeding or Bruising :   No   Rectal Bleeding or Black Stools :   No   Vitamin K Food Handout :   No   Heart Valve Replacement :   No   Alexa Phillips RN - 10/17/2019 8:52 AM CDT   Anticoagulation Recheck :   2 weeks   Warfarin Special Instructions :   Per protocol continue warfarin 5 mg Thur/Sat and 2.5 mg ROW; recheck INR in 2 weeks; pt notified in office.   Alexa Phillips RN - 10/17/2019 8:52 AM CDT

## 2022-02-15 NOTE — NURSING NOTE
Anticoagulation Therapy Management Entered On:  12/28/2020 10:18 AM CST    Performed On:  12/28/2020 10:18 AM CST by Maddy Stevens RN               Anticoagulation Visit Assessment   Type of Visit - Anticoagulation :   Telephone   Anticoagulation Medication Verified :   Yes   Patient on Warfarin :   Yes   Maddy Stevens RN - 12/28/2020 10:18 AM CST   Warfarin   Anticoagulant INR Goal Lower :   2    Anticoagulant INR Goal Upper :   3    Sunday :   2.5 mg   Monday :   5 mg   Tuesday :   2.5 mg   Wednesday :   2.5 mg   Thursday :   5 mg   Friday :   2.5 mg   Saturday :   2.5 mg   Total Dose :   22.5 mg   Warfarin Pt Reported Previous Week Dose :    Sun Mon Tues Wed Thurs Fri Sat Weekly Total Dose   Week 1 2.5 mg 5 mg 2.5 mg 2.5 mg 5 mg 2.5 mg 2.5 mg 22.5 mg   Week 2  mg  mg  mg  mg  mg  mg  mg  mg   Week 3  mg  mg  mg  mg  mg  mg  mg  mg   Week 4  mg  mg  mg  mg  mg  mg  mg  mg         Patient is taking single or multiple strength tablet(s) :   Single strength tab(s)   One Tab Strength :   5 mg tab   Sunday :   2.5 mg   Monday :   5 mg   Tuesday :   2.5 mg   Wednesday :   2.5 mg   Thursday :   5 mg   Friday :   2.5 mg   Saturday :   2.5 mg   Week 1 Total Dose :   22.5 mg   Sunday :   0.5 tab(s)   Monday :   1 tab(s)   Tuesday :   0.5 tab(s)   Wednesday :   0.5 tab(s)   Thursday :   1 tab(s)   Friday :   0.5 tab(s)   Saturday :   0.5 tab(s)   Patient Instructions :   INR = 2.0 per [facility] POC. Per protocol, continue Warfarin 5mg Mon/Thurs and 2.5mg ROW. Recheck INR in 2 weeks. Advised by phone - left Maddy Moctezuma RN - 12/28/2020 10:18 AM CST

## 2022-02-15 NOTE — NURSING NOTE
Anticoagulation Therapy Management Entered On:  5/20/2021 10:27 AM CDT    Performed On:  5/20/2021 10:26 AM CDT by Maddy Stevens RN               Anticoagulation Visit Assessment   Type of Visit - Anticoagulation :   Telephone   Anticoagulation Indication :   Atrial fibrillation   Anticoagulant Duration :   Undetermined   Anticoagulation Medication Verified :   Yes   Maddy Stevens RN - 5/20/2021 10:26 AM CDT   Anticoagulation Patient Assessment Grid   Change in Alcohol Consumption :   No   Change in Diet :   No   Change in Medications :   No   Diarrhea :   No   Rectal Bleeding :   No   Signs of Clotting :   No   Signs of Warfarin Intolerance :   No   Unusual Bleeding, Bruising :   No   Upcoming Procedures :   No   Vomiting :   No   Maddy Stevens RN - 5/20/2021 10:26 AM CDT   Patient on Warfarin :   Yes   Maddy Stevens RN - 5/20/2021 10:26 AM CDT   Warfarin   Anticoagulant INR Goal Lower :   2    Anticoagulant INR Goal Upper :   3    Sunday :   5 mg   Monday :   5 mg   Tuesday :   2.5 mg   Wednesday :   5 mg   Thursday :   5 mg   Friday :   5 mg   Saturday :   2.5 mg   Total Dose :   30 mg   Warfarin Pt Reported Previous Week Dose :    Sun Mon Tues Wed Thurs Fri Sat Weekly Total Dose   Week 1 5 mg 5 mg 2.5 mg 5 mg 5 mg 5 mg 2.5 mg 30 mg   Week 2  mg  mg  mg  mg  mg  mg  mg  mg   Week 3  mg  mg  mg  mg  mg  mg  mg  mg   Week 4  mg  mg  mg  mg  mg  mg  mg  mg         Patient is taking single or multiple strength tablet(s) :   Single strength tab(s)   One Tab Strength :   5 mg tab   Sunday :   5 mg   Monday :   5 mg   Tuesday :   2.5 mg   Wednesday :   5 mg   Thursday :   5 mg   Friday :   5 mg   Saturday :   2.5 mg   Week 1 Total Dose :   30 mg   Sunday :   1 tab(s)   Monday :   1 tab(s)   Tuesday :   0.5 tab(s)   Wednesday :   1 tab(s)   Thursday :   1 tab(s)   Friday :   1 tab(s)   Saturday :   0.5 tab(s)   Warfarin Dosing Acknowledgement :   I have reviewed the patient's warfarin dosing schedule and confirmed its accuracy  for today's visit.   Patient Instructions :   POC INR = 2.9. Per protocol, continue Warfarin 2.5mg Tues/Sat and 5mg ROW. Recheck INR in 2 weeks. Advised by phone - left voicemail.      Rodney OLVREA, Maddy - 5/20/2021 10:26 AM CDT

## 2022-02-15 NOTE — TELEPHONE ENCOUNTER
---------------------  From: Alexa Phillips RN   Sent: 8/4/2021 12:26:00 PM CDT  Subject: INR reminder     Patient is 14 days past due for INR.  First letter has been printed from [employer] and sent to [facility] for scanning and mailing.

## 2022-02-15 NOTE — TELEPHONE ENCOUNTER
---------------------  From: Alexa Phillips RN   Sent: 1/29/2021 3:38:29 PM CST  Subject: INR past due     Patient is 18 days past due for INR.  First letter has been printed from [employer] and sent to [facility] for scanning and mailing.

## 2022-02-15 NOTE — NURSING NOTE
Anticoagulation Therapy Management Entered On:  11/12/2020 11:51 AM CST    Performed On:  11/12/2020 11:48 AM CST by Cristel Hernandez RN               Anticoagulation Visit Assessment   Type of Visit - Anticoagulation :   Face to face   Anticoagulation Medication Verified :   Yes   Cristel Hernandez RN - 11/12/2020 11:48 AM CST   Anticoagulation Patient Assessment Grid   Change in Alcohol Consumption :   No   Change in Diet :   No   Change in Medications :   No   Diarrhea :   No   Rectal Bleeding :   No   Signs of Clotting :   No   Signs of Warfarin Intolerance :   No   Unusual Bleeding, Bruising :   No   Upcoming Procedures :   No   Vomiting :   No   Cristel Hernandez RN - 11/12/2020 11:48 AM CST   Patient on Warfarin :   Yes   Patient on Other Anticoagulant :   No   Cristel Hernandez RN - 11/12/2020 11:48 AM CST   Warfarin   Anticoagulant INR Goal Lower :   2    Anticoagulant INR Goal Upper :   3    Information Given by :   Patient   Sunday :   2.5 mg   Monday :   5 mg   Tuesday :   2.5 mg   Wednesday :   5 mg   Thursday :   2.5 mg   Friday :   5 mg   Saturday :   2.5 mg   Total Dose :   25 mg   Warfarin Pt Reported Previous Week Dose :    Sun Mon Tues Wed Thurs Fri Sat Weekly Total Dose   Week 1 2.5 mg 5 mg 2.5 mg 5 mg 2.5 mg 5 mg 2.5 mg 25 mg   Week 2  mg  mg  mg  mg  mg  mg  mg  mg   Week 3  mg  mg  mg  mg  mg  mg  mg  mg   Week 4  mg  mg  mg  mg  mg  mg  mg  mg         Patient is taking single or multiple strength tablet(s) :   Single strength tab(s)   One Tab Strength :   5 mg tab   Sunday :   2.5 mg   Monday :   5 mg   Tuesday :   2.5 mg   Wednesday :   2.5 mg   Thursday :   5 mg   Friday :   2.5 mg   Saturday :   2.5 mg   Week 1 Total Dose :   22.5 mg   Sunday :   0.5 tab(s)   Monday :   1 tab(s)   Tuesday :   0.5 tab(s)   Wednesday :   0.5 tab(s)   Thursday :   1 tab(s)   Friday :   0.5 tab(s)   Saturday :   0.5 tab(s)   Patient Instructions :   INR = 3.9 per [facility] POC today. Patient is to hold warfarin  today then reduce warfarin to 5mg on Mon and Thurs and 2.5mg the rest of the days of the week Recheck INR on 11/17/20. Patient advised in office.     Cristel Hernandez RN - 11/12/2020 11:48 AM CST   Medication History   Medication List   (As Of: 11/12/2020 11:51:48 AM CST)   Prescription/Discharge Order    alendronate  :   alendronate ; Status:   Prescribed ; Ordered As Mnemonic:   alendronate 35 mg oral tablet ; Simple Display Line:   35 mg, 1 tab(s), Oral, qweek, 12 tab(s), 3 Refill(s) ; Ordering Provider:   Kirk Agarwal MD; Catalog Code:   alendronate ; Order Dt/Tm:   8/4/2020 2:58:12 PM CDT          furosemide  :   furosemide ; Status:   Prescribed ; Ordered As Mnemonic:   furosemide 40 mg oral tablet ; Simple Display Line:   40 mg, 1 tab(s), Oral, daily, for 90 day(s), 90 tab(s), 1 Refill(s) ; Ordering Provider:   Kirk Agarwal MD; Catalog Code:   furosemide ; Order Dt/Tm:   5/4/2020 3:53:00 PM CDT          warfarin  :   warfarin ; Status:   Prescribed ; Ordered As Mnemonic:   warfarin 5 mg oral tablet ; Simple Display Line:   5 mg, 1 tab(s), Oral, daily, 30 tab(s), 0 Refill(s) ; Ordering Provider:   Kirk Agarwal MD; Catalog Code:   warfarin ; Order Dt/Tm:   10/30/2020 9:47:39 AM CDT            Home Meds    flecainide  :   flecainide ; Status:   Documented ; Ordered As Mnemonic:   flecainide 50 mg oral tablet ; Simple Display Line:   50 mg, 1 tab(s), Oral, q12 hrs, 60 tab(s), 0 Refill(s) ; Catalog Code:   flecainide ; Order Dt/Tm:   11/2/2020 1:54:35 PM CST          metoprolol  :   metoprolol ; Status:   Documented ; Ordered As Mnemonic:   metoprolol succinate 50 mg oral capsule, extended release ; Simple Display Line:   25 mg, 0.5 cap(s), Oral, daily, 0 Refill(s) ; Catalog Code:   metoprolol ; Order Dt/Tm:   1/28/2019 2:09:51 PM CST          multivitamin  :   multivitamin ; Status:   Documented ; Ordered As Mnemonic:   Daily Multiple Vitamins ; Simple Display Line:   Oral, daily, 0 Refill(s) ;  Catalog Code:   multivitamin ; Order Dt/Tm:   2/10/2020 9:18:09 AM CST

## 2022-02-15 NOTE — NURSING NOTE
Anticoagulation Therapy Management Entered On:  8/18/2021 3:17 PM CDT    Performed On:  8/18/2021 3:16 PM CDT by Alexa Phillips RN               Anticoagulation Visit Assessment   Type of Visit - Anticoagulation :   Telephone   Anticoagulation Indication :   Atrial fibrillation   Anticoagulant Duration :   Undetermined   Anticoagulation Medication Verified :   Yes   Alexa Phillips RN - 8/18/2021 3:16 PM CDT   Anticoagulation Patient Assessment Grid   Change in Alcohol Consumption :   No   Change in Diet :   No   Change in Medications :   No   Diarrhea :   No   Rectal Bleeding :   No   Signs of Clotting :   No   Signs of Warfarin Intolerance :   No   Unusual Bleeding, Bruising :   No   Upcoming Procedures :   No   Vomiting :   No   Alexa Phillips RN - 8/18/2021 3:16 PM CDT   Patient on Warfarin :   Yes   Alexa Phillips RN - 8/18/2021 3:16 PM CDT   Warfarin   Anticoagulant INR Goal Lower :   2    Anticoagulant INR Goal Upper :   3    Sunday :   5 mg   Monday :   2.5 mg   Tuesday :   5 mg   Wednesday :   2.5 mg   Thursday :   5 mg   Friday :   2.5 mg   Saturday :   5 mg   Total Dose :   27.5 mg   Warfarin Pt Reported Previous Week Dose :    Sun Mon Tues Wed Thurs Fri Sat Weekly Total Dose   Week 1 5 mg 2.5 mg 5 mg 2.5 mg 5 mg 2.5 mg 5 mg 27.5 mg   Week 2  mg  mg  mg  mg  mg  mg  mg  mg   Week 3  mg  mg  mg  mg  mg  mg  mg  mg   Week 4  mg  mg  mg  mg  mg  mg  mg  mg         Patient is taking single or multiple strength tablet(s) :   Single strength tab(s)   One Tab Strength :   5 mg tab   Sunday :   5 mg   Monday :   2.5 mg   Tuesday :   5 mg   Wednesday :   2.5 mg   Thursday :   5 mg   Friday :   2.5 mg   Saturday :   5 mg   Week 1 Total Dose :   27.5 mg   Sunday :   1 tab(s)   Monday :   0.5 tab(s)   Tuesday :   1 tab(s)   Wednesday :   0.5 tab(s)   Thursday :   1 tab(s)   Friday :   0.5 tab(s)   Saturday :   1 tab(s)   Warfarin Dosing Acknowledgement :   I have reviewed the patient's warfarin dosing schedule and  confirmed its accuracy for today's visit.   Patient Instructions :   POC INR = 3.0; per protocol Continue warfarin   2.5mg Mon/ Wed/ Fri  5mg ROW  Recheck 4 weeks weeks.  LVM on listed number.     Jacqueline OLVERA, Alexa YOUNG - 8/18/2021 3:16 PM CDT

## 2022-02-15 NOTE — NURSING NOTE
PT/INR POC Entered On:  3/29/2021 9:51 AM CDT    Performed On:  3/29/2021 9:50 AM CDT by Chayo Cook               PT/INR POC   INR POC :   1.8    Reference Range - INR POC :   2.0-3.0   Chayo Cook - 3/29/2021 9:50 AM CDT   Details   Collection Date :   3/29/2021 9:51 AM CDT   Expiration Date :   4/30/2021 CDT   Lot#/Manufacture :   [id number]   Handling Specimen POC :   Capillary    :   Chayo Anderson - 3/29/2021 9:50 AM CDT

## 2022-02-15 NOTE — NURSING NOTE
Patient presents for INR testing per GTG for a new diagnosis of A Fib. This is the patients first encounter for INR testing. Spent more than 5 minutes discussing typical point of care management schedule, the importance of keeping testing appointments, and possible side effects of warfarin. Discuss dosing with patient.  Discuss high risk medications, diet, and symptoms to watch for. Patient received educational handouts and I answered the patients questions. 40 minutes spent with patient.

## 2022-02-15 NOTE — TELEPHONE ENCOUNTER
Patient was not able to see Dr. Rosas as out of insurance network. Patient is now scheduled to see Dr. Mcdonough 7/11/19 @9am in [place], Patient is aware.

## 2022-02-15 NOTE — NURSING NOTE
Comprehensive Intake Entered On:  6/8/2020 4:00 PM CDT    Performed On:  6/8/2020 3:54 PM CDT by Leona MORALES, Nan               Summary   Chief Complaint :   f/u chemo.  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   Weight Measured :   125 lb(Converted to: 125 lb 0 oz, 56.70 kg)    Height Measured :   62 in(Converted to: 5 ft 2 in, 157.48 cm)    Body Mass Index :   22.86 kg/m2   Body Surface Area :   1.57 m2   Systolic Blood Pressure :   128 mmHg   Diastolic Blood Pressure :   80 mmHg   Mean Arterial Pressure :   96 mmHg   Peripheral Pulse Rate :   70 bpm   BP Site :   Right arm   Pulse Site :   Radial artery   BP Method :   Manual   HR Method :   Manual   Temperature Tympanic :   98.6 DegF(Converted to: 37.0 DegC)    Oxygen Saturation :   97 %   Nan Delgadillo MA - 6/8/2020 3:54 PM CDT   Health Status   Allergies Verified? :   Yes   Medication History Verified? :   Yes   Medical History Verified? :   Yes   Pre-Visit Planning Status :   Completed   Tobacco Use? :   Never smoker   Nan Delgadillo MA - 6/8/2020 3:54 PM CDT   Consents   Consent for Immunization Exchange :   Consent Granted   Consent for Immunizations to Providers :   Consent Granted   Nan Delgadillo MA - 6/8/2020 3:54 PM CDT   Meds / Allergies   (As Of: 6/8/2020 4:00:08 PM CDT)   Allergies (Active)   penicillin  Estimated Onset Date:   Unspecified ; Reactions:   bumps on skin ; Created By:   Zahraa Castillo; Reaction Status:   Active ; Category:   Drug ; Substance:   penicillin ; Type:   Allergy ; Updated By:   Zahraa Castillo; Source:   Patient ; Reviewed Date:   2/7/2020 2:11 PM CST        Medication List   (As Of: 6/8/2020 4:00:08 PM CDT)   Prescription/Discharge Order    furosemide  :   furosemide ; Status:   Prescribed ; Ordered As Mnemonic:   furosemide 40 mg oral tablet ; Simple Display Line:   40 mg, 1 tab(s), Oral, daily, for 90 day(s), 90 tab(s), 1 Refill(s) ; Ordering Provider:   [facility]  Kirk ZAMORANO; Catalog Code:   furosemide ; Order Dt/Tm:   5/4/2020 3:53:00 PM CDT            Home Meds    enoxaparin  :   enoxaparin ; Status:   Documented ; Ordered As Mnemonic:   enoxaparin 60 mg/0.6 mL injectable solution ; Simple Display Line:   Subcutaneous, q12 hrs, 0 Refill(s) ; Catalog Code:   enoxaparin ; Order Dt/Tm:   2/7/2020 2:05:05 PM CST          metoprolol  :   metoprolol ; Status:   Documented ; Ordered As Mnemonic:   metoprolol succinate 50 mg oral capsule, extended release ; Simple Display Line:   25 mg, 0.5 cap(s), Oral, daily, 0 Refill(s) ; Catalog Code:   metoprolol ; Order Dt/Tm:   1/28/2019 2:09:51 PM CST          multivitamin  :   multivitamin ; Status:   Documented ; Ordered As Mnemonic:   Daily Multiple Vitamins ; Simple Display Line:   Oral, daily, 0 Refill(s) ; Catalog Code:   multivitamin ; Order Dt/Tm:   2/10/2020 9:18:09 AM CST            ID Risk Screen   Recent Travel History :   No recent travel   Family Member Travel History :   No recent travel   Other Exposure to Infectious Disease :   Unknown   Leona MORALES, Nan - 6/8/2020 3:54 PM CDT

## 2022-02-15 NOTE — TELEPHONE ENCOUNTER
---------------------From: Alexa Phillips RN To: [email or web address] Pool ([id number]_[facility]);   Sent: 1/6/2020 2:13:44 PM CST !Subject: POC INR > 4.5 Abnormal POC value greater than 4.5BP:  94/70 P:86 T: 100.4 R: 24 O2: 95% RASide Effects/Bleeding:  DeniesINR protocol per: GTGDosage:  5 mg Thur Sat, 2.5 mg ROWDiet/Med changes: See attached. Patient not taking Lasix.Discussion/Education:  Today the patient s Point of Care value was out of the target range.  The patient is referred to lab for urgent venous testing per policy.  Reviewed diet, current medications, and symptoms with the patient.  Additional instructions will be given to the patient regarding the provider s recommendations for medication adjustment. Spent more than 5 minutes with the patient discussing the abnormal INR value and/or related issues. I answered the patient s questions.  Urgent Lab result will be brought to [facility] when received from lab.---------------------From: Leona MORALES, Nan ([facility] Message Pool ([id number]_[facility])) To: Kirk Agarwal MD;   Sent: 1/6/2020 2:15:51 PM CSTSubject: FW: POC INR > 4.5noted, warfarin dose reduced

## 2022-02-15 NOTE — RESULTS
Anticoagulation Therapy Entered On:  6/6/2019 3:53 PM CDT    Performed On:  6/6/2019 9:51 AM CDT by Alexa Phillips RN               Warfarin Management   Week 1 Sunday Dose :   2.5    Week 1 Monday Dose :   2.5    Week 1 Tuesday Dose :   2.5    Week 1 Wednesday Dose :   2.5    Week 1 Thursday Dose :   5    Week 1 Friday Dose :   2.5    Week 1 Saturday Dose :   5    Week 1 Dose Total :   22.5    Week 2 Sunday Dose :   2.5    Week 2 Monday Dose :   2.5    Week 2 Tuesday Dose :   2.5    Week 2 Wednesday Dose :   2.5    Week 2 Thursday Dose :   5    Week 2 Friday Dose :   2.5    Week 2 Saturday Dose :   5    Week 2 Dose Total :   22.5    Indication :   Atrial fibrillation   Warfarin Management Comments :   Lab test performed by:  [facility] Office  [address] [place] [address]  Phone # [phone]  Fax# [phone]  Capillary   International Normalization Ratio :   2.1    INR Range :   2.0 - 3.0   INR Therapeutic Range :   Yes   Alexa Phillips RN - 6/6/2019 3:52 PM CDT   Warfarin Management and Results Grid   Signs of Thrombolic :   No   Signs of Warfarin Intolerance :   No   Changes in Diet or Alcohol Intake :   No   Changes in Medication or Antibiotics :   No   Unusual Bleeding or Bruising :   No   Rectal Bleeding or Black Stools :   No   Vitamin K Food Handout :   No   Heart Valve Replacement :   No   Alexa Phillips RN - 6/6/2019 3:52 PM CDT   Anticoagulation Recheck :   2 weeks   Warfarin Special Instructions :   Per GTG continue warfarin 5 mg Thur/Sat and 2.5 mg ROW; recheck 2 weeks; pt notified by phone.   Alexa Phillips RN - 6/6/2019 3:52 PM CDT

## 2022-02-15 NOTE — NURSING NOTE
Anticoagulation Therapy Management Entered On:  12/10/2020 9:01 AM CST    Performed On:  12/10/2020 8:59 AM CST by Alexa Phillips RN               Anticoagulation Visit Assessment   Type of Visit - Anticoagulation :   Face to face   Anticoagulation Medication Verified :   Yes   Alexa Phillips RN - 12/10/2020 8:59 AM CST   Anticoagulation Patient Assessment Grid   Change in Alcohol Consumption :   No   Change in Diet :   No   Change in Medications :   Yes   (Comment: Starting antiacid today per cardiologist. [Alexa Phillips RN - 12/10/2020 8:59 AM CST] )   Diarrhea :   No   Rectal Bleeding :   No   Signs of Clotting :   No   Signs of Warfarin Intolerance :   No   Unusual Bleeding, Bruising :   No   Upcoming Procedures :   Yes   (Comment: Ablation at [facility] on 12/21/20 [Alexa Phillips RN - 12/10/2020 8:59 AM CST] )   Vomiting :   No   Alexa Phillips RN - 12/10/2020 8:59 AM CST   Patient on Warfarin :   Yes   Alexa Phillips RN - 12/10/2020 8:59 AM CST   Warfarin   Anticoagulant INR Goal Lower :   2    Anticoagulant INR Goal Upper :   3    Sunday :   2.5 mg   Monday :   5 mg   Tuesday :   2.5 mg   Wednesday :   2.5 mg   Thursday :   5 mg   Friday :   2.5 mg   Saturday :   2.5 mg   Total Dose :   22.5 mg   Warfarin Pt Reported Previous Week Dose :    Sun Mon Tues Wed Thurs Fri Sat Weekly Total Dose   Week 1 2.5 mg 5 mg 2.5 mg 2.5 mg 5 mg 2.5 mg 2.5 mg 22.5 mg   Week 2  mg  mg  mg  mg  mg  mg  mg  mg   Week 3  mg  mg  mg  mg  mg  mg  mg  mg   Week 4  mg  mg  mg  mg  mg  mg  mg  mg         Patient is taking single or multiple strength tablet(s) :   Single strength tab(s)   One Tab Strength :   5 mg tab   Sunday :   2.5 mg   Monday :   5 mg   Tuesday :   2.5 mg   Wednesday :   2.5 mg   Thursday :   5 mg   Friday :   2.5 mg   Saturday :   2.5 mg   Week 1 Total Dose :   22.5 mg   Sunday :   0.5 tab(s)   Monday :   1 tab(s)   Tuesday :   0.5 tab(s)   Wednesday :   0.5 tab(s)   Thursday :   1 tab(s)   Friday :   0.5  tab(s)   Saturday :   0.5 tab(s)   Patient Instructions :   [facility] POC INR = 1.9; per protocol continue warfarin 5 mg Mon/Thur and 2.5 mg ROW; recheck INR in 1 week; pt notified in office and given written instructions.     Jacqueline OLVERA, Alexa YOUNG - 12/10/2020 8:59 AM CST

## 2022-02-15 NOTE — CARE COORDINATION
Patient:   MARNI SANTANA            MRN: [id number]            FIN: [id number]               Age:   69 years     Sex:  Female     :  1950   Associated Diagnoses:   None   Author:   Karime Lowe CMA      Care Coordination Hospital Discharge Note    Sources of Information:  [ X  ] Patient, family member, or caregiver (Please list):  LMTCB at 1019am 19, Spoke with Marni at 214pm.  SHe is doing well.  She has no questions or concerns from her discharge instructions, f/u appointments or medicaitons.  [ X  ] Hospital discharge summary  [   ] Hospital fax  [   ] List of recent hospitalizations or ED visits  [   ] Other:   Last Attending:  Geovany Prado MD    Discharged From: [facility]   Admission Date: 19  Discharge Date: 19  Discharge Plan: [facility] to home  Diagnosis/Problem: Pleural effusion on Right    Medication Changes: [  X ] Yes [   ] No   Medication List Updated: [X   ] Yes [   ] No    Needs Referral or Lab: [   ] Yes [ X  ] No  Outpatient Provider Recommendations: Follow-up on studies from pleural fluid sample.  Cytology ordered to assess as well.  Monitor for any recurrence of pleural effusion.  Added Lasix for diuresis  Needs Follow-up Appointment:  [  X ] Within 7 days of discharge (highly complex visit)  [   ] Within 14 days of discharge (moderately complex visit)    Appointment Made With: Kirk Agarwal MD  Date: 2020    Additional Information Needed and Requested:  [   ] Yes:  [ X  ] No

## 2022-02-15 NOTE — TELEPHONE ENCOUNTER
---------------------  From: Maddy Stevens RN (INR Pool ( [address] [place] [address]))   To: GTG Message Pool ([address] [place] [address]);     Sent: 2/13/2020 3:15:07 PM CST  Subject: Warfarin     Has patient's Warfarin been discontinued indefinitely?    Wanting to update information in [facility], so patient does not receive reminder letters for missed appointments.    Thank You!---------------------  From: Alison Waldron LPN ([facility] Message Pool ([address] [place] [address]))   To: Kirk Agarwal MD;     Sent: 2/13/2020 3:18:49 PM CST  Subject: FW: Warfarin---------------------  From: Kirk Agarwal MD   To: [facility] Message Pool ([address] [place] [address]);     Sent: 2/13/2020 3:46:22 PM CST  Subject: RE: Warfarin     Warfarin has been discontinued for the near future---------------------  From: Nan Delgadillo MA ([facility] Message Pool ([address] [place] [address]))   To: INR Pool ( [address] [place] [address]);     Sent: 2/13/2020 3:55:01 PM CST  Subject: FW: WarfarinINR card sent back to [facility] Pt deactivated in [facility]

## 2022-02-15 NOTE — TELEPHONE ENCOUNTER
---------------------  From: Cristel Hernandez RN (INR Pool ( [id number]))   Sent: 2/17/2021 4:14:43 PM CST  Subject: Over due INR     Call to patient. Request she schedule an INR as soon as possible. Last INR was 1/4/21. Patient is advised to be seen in ER if symptoms of bleeding, these symptoms reviewed.  She tells me she had an INR in 2 weeks ago at [facility] but she has had some med changes and will schedule an INR next week.

## 2022-02-15 NOTE — NURSING NOTE
Anticoagulation Therapy Management Entered On:  10/30/2020 9:57 AM CDT    Performed On:  10/30/2020 9:55 AM CDT by Maddy Stevens RN               Anticoagulation Visit Assessment   Type of Visit - Anticoagulation :   Face to face   Anticoagulation Medication Verified :   Yes   Maddy Stevens RN - 10/30/2020 9:55 AM CDT   Anticoagulation Patient Assessment Grid   Change in Alcohol Consumption :   No   Change in Diet :   No   Change in Medications :   No   Diarrhea :   No   Rectal Bleeding :   No   Signs of Clotting :   No   Signs of Warfarin Intolerance :   No   Unusual Bleeding, Bruising :   No   Upcoming Procedures :   No   Vomiting :   No   Maddy Stevens RN - 10/30/2020 9:55 AM CDT   Patient on Warfarin :   Yes   Maddy Stevens RN - 10/30/2020 9:55 AM CDT   Warfarin   Sunday :   0 mg   Monday :   0 mg   Tuesday :   0 mg   Wednesday :   5 mg   Thursday :   5 mg   Friday :   0 mg   Saturday :   0 mg   Total Dose :   10 mg   Warfarin Pt Reported Previous Week Dose :    Sun Mon Tues Wed Thurs Fri Sat Weekly Total Dose   Week 1                   Week 2                   Week 3                   Week 4                         Sunday :   5 mg   Monday :   0 mg   Tuesday :   0 mg   Wednesday :   0 mg   Thursday :   0 mg   Friday :   5 mg   Saturday :   2.5 mg   Week 1 Total Dose :   12.5 mg   Patient Instructions :   INR = 1.5 per [facility] POC. Per protocol, take 5mg Warfarin today, 2.5mg Saturday, 5mg Sunday - recheck INR Monday 11/2/20. Advised in office.      Maddy Stevens RN - 10/30/2020 9:55 AM CDT

## 2022-02-15 NOTE — NURSING NOTE
Anticoagulation Therapy Management Entered On:  12/15/2020 10:45 AM CST    Performed On:  12/15/2020 10:44 AM CST by Maddy Stevens RN               Anticoagulation Visit Assessment   Type of Visit - Anticoagulation :   Face to face   Anticoagulation Medication Verified :   Yes   Maddy Stevens RN - 12/15/2020 10:44 AM CST   Anticoagulation Patient Assessment Grid   Upcoming Procedures :   Yes   (Comment: Ablation 12/21/20 [Maddy Stevens RN - 12/15/2020 10:44 AM CST] )   Maddy Stevens RN - 12/15/2020 10:44 AM CST   Patient on Warfarin :   Yes   Maddy Stevens RN - 12/15/2020 10:44 AM CST   Warfarin   Anticoagulant INR Goal Lower :   2    Anticoagulant INR Goal Upper :   3    Sunday :   2.5 mg   Monday :   5 mg   Tuesday :   2.5 mg   Wednesday :   2.5 mg   Thursday :   5 mg   Friday :   2.5 mg   Saturday :   2.5 mg   Total Dose :   22.5 mg   Warfarin Pt Reported Previous Week Dose :    Sun Mon Tues Wed Thurs Fri Sat Weekly Total Dose   Week 1 2.5 mg 5 mg 2.5 mg 2.5 mg 5 mg 2.5 mg 2.5 mg 22.5 mg   Week 2  mg  mg  mg  mg  mg  mg  mg  mg   Week 3  mg  mg  mg  mg  mg  mg  mg  mg   Week 4  mg  mg  mg  mg  mg  mg  mg  mg         Patient is taking single or multiple strength tablet(s) :   Single strength tab(s)   One Tab Strength :   5 mg tab   Sunday :   2.5 mg   Monday :   5 mg   Tuesday :   2.5 mg   Wednesday :   2.5 mg   Thursday :   5 mg   Friday :   2.5 mg   Saturday :   2.5 mg   Week 1 Total Dose :   22.5 mg   Sunday :   0.5 tab(s)   Monday :   1 tab(s)   Tuesday :   0.5 tab(s)   Wednesday :   0.5 tab(s)   Thursday :   1 tab(s)   Friday :   0.5 tab(s)   Saturday :   0.5 tab(s)   Patient Instructions :   INr = 2.3 per [facility] P[facility]. Per protocol, continue Warfarin 5mg MOn/Thurs and 2.5mg ROW. Recheck INR in 2 weeks. Advised in office.      Maddy Stevens RN - 12/15/2020 10:44 AM CST

## 2022-02-15 NOTE — RESULTS
Anticoagulation Therapy Entered On:  6/21/2019 12:17 PM CDT    Performed On:  6/21/2019 12:14 PM CDT by Cristel Hernandez RN               Warfarin Management   Week 1 Sunday Dose :   2.5    Week 1 Monday Dose :   2.5    Week 1 Tuesday Dose :   2.5    Week 1 Wednesday Dose :   2.5    Week 1 Thursday Dose :   5    Week 1 Friday Dose :   2.5    Week 1 Saturday Dose :   5    Week 1 Dose Total :   22.5    Week 2 Sunday Dose :   2.5    Week 2 Monday Dose :   2.5    Week 2 Tuesday Dose :   2.5    Week 2 Wednesday Dose :   2.5    Week 2 Thursday Dose :   5    Week 2 Friday Dose :   2.5    Week 2 Saturday Dose :   5    Week 2 Dose Total :   22.5    Planned Duration :   Indefinite   Indication :   Atrial fibrillation   Warfarin Management Comments :   [facility] Office  [address] [facility], [address]  [phone]  Capillary Specimen     International Normalization Ratio :   2.0    INR Range :   2.0 - 3.0   INR Therapeutic Range :   Yes   Warfarin Abnormal Findings :   none   Anticoagulation Recheck :   2 weeks   Warfarin Physician :   Kirk Agarwal MD Special Instructions :   INR = 2.0 per POC Patient is to stay on 5mg warfarin on Thurs and Sat. 2.5mg the rest of the days of the week Recheck INR in 2 weeks per GTG. Patient advised via call to home.   Cristel Hernandez RN - 6/21/2019 12:14 PM CDT

## 2022-02-15 NOTE — NURSING NOTE
Comprehensive Intake Entered On:  1/10/2020 11:34 AM CST    Performed On:  1/10/2020 11:26 AM CST by Susanna Frias CMA               Summary   Chief Complaint :   f/u test results - still some SOB and cough.    Height Measured :   62 in(Converted to: 5 ft 2 in, 157.48 cm)    Systolic Blood Pressure :   104 mmHg   Diastolic Blood Pressure :   68 mmHg   Mean Arterial Pressure :   80 mmHg   Peripheral Pulse Rate :   84 bpm   BP Site :   Right arm   Pulse Site :   Radial artery   BP Method :   Manual   HR Method :   Manual   Temperature Tympanic :   99.0 DegF(Converted to: 37.2 DegC)    Oxygen Saturation :   97 %   Susanna Frias CMA - 1/10/2020 11:26 AM CST   Health Status   Allergies Verified? :   Yes   Medication History Verified? :   Yes   Pre-Visit Planning Status :   Not completed   Susanna Frias CMA - 1/10/2020 11:26 AM CST   Meds / Allergies   (As Of: 1/10/2020 11:34:17 AM CST)   Allergies (Active)   penicillin  Estimated Onset Date:   Unspecified ; Reactions:   bumps on skin ; Created By:   Zahraa Castillo; Reaction Status:   Active ; Category:   Drug ; Substance:   penicillin ; Type:   Allergy ; Updated By:   Zahraa Castillo; Source:   Patient ; Reviewed Date:   4/18/2019 2:06 PM CDT        Medication List   (As Of: 1/10/2020 11:34:17 AM CST)   Home Meds    furosemide  :   furosemide ; Status:   Documented ; Ordered As Mnemonic:   furosemide 40 mg oral tablet ; Simple Display Line:   40 mg, 1 tab(s), Oral, daily, 0 Refill(s) ; Catalog Code:   furosemide ; Order Dt/Tm:   12/31/2019 10:26:55 AM CST          metoprolol  :   metoprolol ; Status:   Documented ; Ordered As Mnemonic:   metoprolol succinate 50 mg oral capsule, extended release ; Simple Display Line:   50 mg, 1 cap(s), Oral, daily, 0 Refill(s) ; Catalog Code:   metoprolol ; Order Dt/Tm:   1/28/2019 2:09:51 PM CST          warfarin  :   warfarin ; Status:   Documented ; Ordered As Mnemonic:   warfarin 5 mg oral tablet ; Simple Display Line:   2.5 mg,  0.5 tab(s), Oral, daily, 0 Refill(s) ; Catalog Code:   warfarin ; Order Dt/Tm:   1/28/2019 2:10:03 PM CST

## 2022-02-15 NOTE — PROGRESS NOTES
Patient:   MARNI SANTANA            MRN: [id number]            FIN: [id number]               Age:   69 years     Sex:  Female     :  1950   Associated Diagnoses:   None   Author:   Tanya Mcgrath MD      Procedure   EKG procedure   Date:  2019.     Indication: dyspnea.     EKG findings   Interpretation: Tanya Mcgrath MD.     Rhythm: heart rate  66  beats/min.     Axis: normal axis, normal configuration.     Intervals: normal.     P waves: normal.     QRS complex: normal.     ST-T-U complex: normal.     Interpretation: normal EKG.

## 2022-02-15 NOTE — RESULTS
Anticoagulation Therapy Entered On:  8/1/2019 10:08 AM CDT    Performed On:  8/1/2019 10:07 AM CDT by Maddy Stevens RN               Warfarin Management   Week 1 Sunday Dose :   2.5    Week 1 Monday Dose :   2.5    Week 1 Tuesday Dose :   2.5    Week 1 Wednesday Dose :   2.5    Week 1 Thursday Dose :   5    Week 1 Friday Dose :   2.5    Week 1 Saturday Dose :   5    Week 1 Dose Total :   22.5    Week 2 Sunday Dose :   2.5    Week 2 Monday Dose :   2.5    Week 2 Tuesday Dose :   2.5    Week 2 Wednesday Dose :   2.5    Week 2 Thursday Dose :   5    Week 2 Friday Dose :   2.5    Week 2 Saturday Dose :   5    Week 2 Dose Total :   22.5    Indication :   Atrial fibrillation   Warfarin Management Comments :   Lab test performed by:  [facility] Office  [address] [place] [address]  Phone # [phone]  Fax# [phone]  Capillary   International Normalization Ratio :   2.3    INR Range :   2.0 - 3.0   INR Therapeutic Range :   Yes   Anticoagulation Recheck :   4 weeks   Warfarin Special Instructions :   Continue warfarin 5mg Thurs, Sat and 2.5mg ROW. Recheck INR in 4 weeks per protocol. Advised in office.   Maddy Stevens RN - 8/1/2019 10:07 AM CDT

## 2022-02-15 NOTE — NURSING NOTE
PT/INR POC Entered On:  5/20/2021 9:44 AM CDT    Performed On:  5/20/2021 9:43 AM CDT by ABRAM SINCLAIR               PT/INR POC   INR POC :   2.9    Reference Range - INR POC :   2.0-3.0   ABRAM SINCLAIR - 5/20/2021 9:43 AM CDT   Details   Collection Date :   5/20/2021 9:44 AM CDT   Expiration Date :   4/30/2022 CDT   Lot#/Manufacture :   [id number]   ABRAM SINCLAIR - 5/20/2021 9:43 AM CDT

## 2022-02-15 NOTE — NURSING NOTE
Comprehensive Intake Entered On:  1/28/2019 2:12 PM CST    Performed On:  1/28/2019 2:05 PM CST by Nan Delgadillo MA               Summary   Chief Complaint :   follow up to [facility] hospital stay 1/21/19--1/25/19 for suicide attempt, HTN, atrial fibrillation.   Weight Measured :   120.6 lb(Converted to: 120 lb 10 oz, 54.70 kg)    Height Measured :   62 in(Converted to: 5 ft 2 in, 157.48 cm)    Body Mass Index :   22.06 kg/m2   Body Surface Area :   1.55 m2   Systolic Blood Pressure :   128 mmHg   Diastolic Blood Pressure :   88 mmHg (HI)    Mean Arterial Pressure :   101 mmHg   Peripheral Pulse Rate :   72 bpm   BP Site :   Right arm   Pulse Site :   Radial artery   BP Method :   Manual   HR Method :   Manual   Temperature Tympanic :   99.3 DegF(Converted to: 37.4 DegC)    Nan Delgadillo MA - 1/28/2019 2:05 PM CST   Health Status   Allergies Verified? :   Yes   Medication History Verified? :   Yes   Medical History Verified? :   Yes   Pre-Visit Planning Status :   Not completed   Tobacco Use? :   Never smoker   Nan Delgadillo MA - 1/28/2019 2:05 PM CST   Consents   Consent for Immunization Exchange :   Consent Granted   Consent for Immunizations to Providers :   Consent Granted   Nan Delgadillo MA - 1/28/2019 2:05 PM CST   Meds / Allergies   (As Of: 1/28/2019 2:12:54 PM CST)   Allergies (Active)   penicillin  Estimated Onset Date:   Unspecified ; Created By:   Nan Delgadillo MA; Reaction Status:   Active ; Category:   Drug ; Substance:   penicillin ; Type:   Allergy ; Updated By:   Nan Delgadillo MA; Source:   Patient ; Reviewed Date:   1/28/2019 2:10 PM CST        Medication List   (As Of: 1/28/2019 2:12:55 PM CST)   Home Meds    metoprolol  :   metoprolol ; Status:   Documented ; Ordered As Mnemonic:   metoprolol succinate 50 mg oral capsule, extended release ; Simple Display Line:   50 mg, 1 cap(s), Oral, daily, 0 Refill(s) ; Catalog Code:   metoprolol ; Order Dt/Tm:   1/28/2019 2:09:51 PM          warfarin   :   warfarin ; Status:   Documented ; Ordered As Mnemonic:   warfarin 5 mg oral tablet ; Simple Display Line:   5 mg, 1 tab(s), Oral, daily, 0 Refill(s) ; Catalog Code:   warfarin ; Order Dt/Tm:   1/28/2019 2:10:03 PM            Social History   Social History   (As Of: 1/28/2019 2:12:55 PM CST)   Tobacco:        Never (less than 100 in lifetime)   (Last Updated: 1/28/2019 2:11:08 PM CST by Leona MORALES, Nan)

## 2022-02-15 NOTE — NURSING NOTE
Anticoagulation Therapy Entered On:  1/28/2019 4:13 PM CST    Performed On:  1/28/2019 4:10 PM CST by Cristel Hernandez RN               Warfarin Management   Planned Duration :   Indefinite   Indication :   Atrial fibrillation   International Normalization Ratio :   4.7    INR Range :   2.0 - 3.0   INR Therapeutic Range :   No   Cristel Hernandez RN - 1/28/2019 4:10 PM CST   Warfarin Management and Results Grid   Signs of Thrombolic :   No   Signs of Warfarin Intolerance :   No   Changes in Diet or Alcohol Intake :   No   Changes in Medication or Antibiotics :   No   Unusual Bleeding or Bruising :   No   Rectal Bleeding or Black Stools :   No   Cristel Hernandez RN - 1/28/2019 4:10 PM CST   Anticoagulation Recheck :   1/29/19   Warfarin Physician :   Kirk Agarwal MD Special Instructions :   INR = 4.7 per [facility] lab. Patient is to hold warfarin today and recheck INR on 1/29/19 per GTG Patient advised in office.   Cristel Hernandez RN - 1/28/2019 4:10 PM CST

## 2022-02-15 NOTE — NURSING NOTE
Anticoagulation Therapy Management Entered On:  11/24/2020 2:04 PM CST    Performed On:  11/24/2020 2:03 PM CST by Maddy Stevens RN               Anticoagulation Visit Assessment   Type of Visit - Anticoagulation :   Face to face   Anticoagulation Medication Verified :   Yes   Patient on Warfarin :   Yes   Maddy Stevens RN - 11/24/2020 2:03 PM CST   Warfarin   Anticoagulant INR Goal Lower :   2    Anticoagulant INR Goal Upper :   3    Sunday :   2.5 mg   Monday :   5 mg   Tuesday :   2.5 mg   Wednesday :   2.5 mg   Thursday :   5 mg   Friday :   2.5 mg   Saturday :   2.5 mg   Total Dose :   22.5 mg   Warfarin Pt Reported Previous Week Dose :    Sun Mon Tues Wed Thurs Fri Sat Weekly Total Dose   Week 1 2.5 mg 5 mg 2.5 mg 2.5 mg 5 mg 2.5 mg 2.5 mg 22.5 mg   Week 2  mg  mg  mg  mg  mg  mg  mg  mg   Week 3  mg  mg  mg  mg  mg  mg  mg  mg   Week 4  mg  mg  mg  mg  mg  mg  mg  mg         Patient is taking single or multiple strength tablet(s) :   Single strength tab(s)   One Tab Strength :   5 mg tab   Sunday :   2.5 mg   Monday :   5 mg   Tuesday :   2.5 mg   Wednesday :   2.5 mg   Thursday :   5 mg   Friday :   2.5 mg   Saturday :   2.5 mg   Week 1 Total Dose :   22.5 mg   Sunday :   0.5 tab(s)   Monday :   1 tab(s)   Tuesday :   0.5 tab(s)   Wednesday :   0.5 tab(s)   Thursday :   1 tab(s)   Friday :   0.5 tab(s)   Saturday :   0.5 tab(s)   Patient Instructions :   INR = 2.6 per [facility]. Per protocol, continue Warfarin 5mg Mon/Thurs and 2.5mg ROW. Recheck INR in 2 weeks. Advised in office.      Maddy Stevens RN - 11/24/2020 2:03 PM CST

## 2022-02-15 NOTE — RESULTS
Anticoagulation Therapy Entered On:  7/5/2019 10:01 AM CDT    Performed On:  7/5/2019 10:00 AM CDT by Alexa Phillips RN               Warfarin Management   Week 1 Sunday Dose :   2.5    Week 1 Monday Dose :   2.5    Week 1 Tuesday Dose :   2.5    Week 1 Wednesday Dose :   2.5    Week 1 Thursday Dose :   5    Week 1 Friday Dose :   2.5    Week 1 Saturday Dose :   5    Week 1 Dose Total :   22.5    Week 2 Sunday Dose :   2.5    Week 2 Monday Dose :   2.5    Week 2 Tuesday Dose :   2.5    Week 2 Wednesday Dose :   2.5    Week 2 Thursday Dose :   5    Week 2 Friday Dose :   2.5    Week 2 Saturday Dose :   5    Week 2 Dose Total :   22.5    Planned Duration :   Indefinite   Indication :   Atrial fibrillation   Warfarin Management Comments :   Lab test performed by:  [facility]  [address] [place] [address]  Phone # [phone]  Fax# [phone]  Capillary   International Normalization Ratio :   2.4    INR Range :   2.0 - 3.0   INR Therapeutic Range :   Yes   Alexa Phillips RN - 7/5/2019 10:00 AM CDT   Warfarin Management and Results Grid   Signs of Thrombolic :   No   Signs of Warfarin Intolerance :   No   Changes in Diet or Alcohol Intake :   No   Changes in Medication or Antibiotics :   No   Unusual Bleeding or Bruising :   No   Rectal Bleeding or Black Stools :   No   Vitamin K Food Handout :   No   Heart Valve Replacement :   No   Alexa Phillips RN - 7/5/2019 10:00 AM CDT   Anticoagulation Recheck :   4 weeks   Warfarin Special Instructions :   Per protocol continue warfairn 5 mg Thur/Sat and 2.5 mg ROW; recheck 4 weeks; notified in office.   Alexa Phillips RN - 7/5/2019 10:00 AM CDT

## 2022-02-15 NOTE — TELEPHONE ENCOUNTER
---------------------  From: Flakita Sethi   To: Any ZAMORANO, Kirk;     Sent: 10/2/2019 10:28:59 AM CDT  Subject: Scheduling Management     Patient no showed appointment. Appointment was for a follow up on recent tests.

## 2022-02-15 NOTE — NURSING NOTE
CAGE Assessment Entered On:  8/4/2020 2:00 PM CDT    Performed On:  8/4/2020 2:00 PM CDT by Nan Delgadillo MA               Assessment   Have you ever felt you should cut down on your drinking :   No   Have people annoyed you by criticizing your drinking :   No   Have you ever felt bad or guilty about your drinking :   No   Have you ever taken a drink first thing in the morning to steady your nerves or get rid of a hangover (Eye-opener) :   No   CAGE Score :   0    Nan Delgadillo MA - 8/4/2020 2:00 PM CDT

## 2022-02-15 NOTE — NURSING NOTE
Anticoagulation Therapy Entered On:  2/1/2019 11:53 AM CST    Performed On:  2/1/2019 11:52 AM CST by Alexa Phillips RN               Warfarin Management   Week 1 Sunday Dose :   2.5    Week 1 Monday Dose :   2.5    Week 1 Tuesday Dose :   2.5    Week 1 Wednesday Dose :   2.5    Week 1 Thursday Dose :   2.5    Week 1 Friday Dose :   2.5    Week 1 Saturday Dose :   2.5    Week 1 Dose Total :   17.5    Indication :   Atrial fibrillation   Warfarin Management Comments :   Lab Test performed by:  [facility]  [address] [place] [address]  Phone # [phone]  Fax # [phone]   INR Range :   2.0 - 3.0   INR Therapeutic Range :   Yes   Alexa Phillips RN - 2/1/2019 11:52 AM CST   Warfarin Management and Results Grid   Signs of Thrombolic :   No   Signs of Warfarin Intolerance :   No   Changes in Diet or Alcohol Intake :   No   Changes in Medication or Antibiotics :   No   Unusual Bleeding or Bruising :   No   Rectal Bleeding or Black Stools :   No   Vitamin K Food Handout :   No   Heart Valve Replacement :   No   Alexa Phillips RN - 2/1/2019 11:52 AM CST   Anticoagulation Recheck :   3 days   Warfarin Special Instructions :   Urgent venous INR = 2.6; per [facility] continue warfarin 2.5 mg daily, recheck 2/4/19; notified in office and written directions given   Alexa Phillips RN - 2/1/2019 11:52 AM CST

## 2022-02-15 NOTE — NURSING NOTE
Anticoagulation Therapy Management Entered On:  11/2/2020 2:00 PM CST    Performed On:  11/2/2020 1:59 PM CST by Maddy Stevens RN               Anticoagulation Visit Assessment   Type of Visit - Anticoagulation :   Face to face   Anticoagulation Medication Verified :   Yes   Maddy Stevens RN - 11/2/2020 1:59 PM CST   Anticoagulation Patient Assessment Grid   Change in Medications :   Yes   (Comment: Tambocor added by Cardio [Maddy Stevens RN - 11/2/2020 1:59 PM CST] )   Maddy Stevens RN - 11/2/2020 1:59 PM CST   Patient on Warfarin :   Yes   Maddy Stevens RN - 11/2/2020 1:59 PM CST   Warfarin   Sunday :   5 mg   Monday :   0 mg   Tuesday :   0 mg   Wednesday :   5 mg   Thursday :   5 mg   Friday :   5 mg   Saturday :   2.5 mg   Total Dose :   22.5 mg   Warfarin Pt Reported Previous Week Dose :    Sun Mon Tues Wed Thurs Fri Sat Weekly Total Dose   Week 1 5 mg 0 mg 0 mg 0 mg 0 mg 5 mg 2.5 mg 12.5 mg   Week 2  mg  mg  mg  mg  mg  mg  mg  mg   Week 3  mg  mg  mg  mg  mg  mg  mg  mg   Week 4  mg  mg  mg  mg  mg  mg  mg  mg         Sunday :   2.5 mg   Monday :   2.5 mg   Tuesday :   2.5 mg   Wednesday :   2.5 mg   Thursday :   2.5 mg   Friday :   2.5 mg   Saturday :   2.5 mg   Week 1 Total Dose :   17.5 mg   Patient Instructions :   INR = 2.5 per [facility]. Per protocol, take 2.5mg daily (20% decrease). Recheck INR on 11/6/20. Advised in office.      Maddy Stevens RN - 11/2/2020 1:59 PM CST

## 2022-02-15 NOTE — NURSING NOTE
PT/INR POC Entered On:  4/21/2021 9:33 AM CDT    Performed On:  4/21/2021 9:32 AM CDT by Yanet March               PT/INR POC   INR POC :   1.9    Reference Range - INR POC :   2.0-3.0   Yanet March - 4/21/2021 9:32 AM CDT   Details   Collection Date :   4/21/2021 9:32 AM CDT   Expiration Date :   4/30/2021 CDT   Lot#/Manufacture :   [id number]   Handling Specimen POC :   capillary    :   Yanet Bray - 4/21/2021 9:32 AM CDT

## 2022-02-15 NOTE — NURSING NOTE
Comprehensive Intake Entered On:  4/18/2019 2:08 PM CDT    Performed On:  4/18/2019 2:03 PM CDT by Alison Waldron LPN               Summary   Chief Complaint :   Annual physical   Weight Measured :   122 lb(Converted to: 122 lb 0 oz, 55.34 kg)    Height Measured :   62 in(Converted to: 5 ft 2 in, 157.48 cm)    Body Mass Index :   22.31 kg/m2   Body Surface Area :   1.55 m2   Systolic Blood Pressure :   128 mmHg   Diastolic Blood Pressure :   82 mmHg (HI)    Mean Arterial Pressure :   97 mmHg   Peripheral Pulse Rate :   62 bpm   BP Site :   Right arm   Pulse Site :   Radial artery   BP Method :   Manual   HR Method :   Manual   Temperature Tympanic :   97.3 DegF(Converted to: 36.3 DegC)  (LOW)    Alison Waldron LPN - 4/18/2019 2:03 PM CDT   Health Status   Allergies Verified? :   Yes   Medication History Verified? :   Yes   Pre-Visit Planning Status :   Completed   Tobacco Use? :   Never smoker   Alison Waldron LPN - 4/18/2019 2:03 PM CDT   Consents   Consent for Immunization Exchange :   Consent Granted   Consent for Immunizations to Providers :   Consent Granted   Alison Waldron LPN - 4/18/2019 2:03 PM CDT   Meds / Allergies   (As Of: 4/18/2019 2:08:28 PM CDT)   Allergies (Active)   penicillin  Estimated Onset Date:   Unspecified ; Reactions:   bumps on skin ; Created By:   Zahraa Castillo; Reaction Status:   Active ; Category:   Drug ; Substance:   penicillin ; Type:   Allergy ; Updated By:   Zahraa Castillo; Source:   Patient ; Reviewed Date:   4/18/2019 2:06 PM CDT        Medication List   (As Of: 4/18/2019 2:08:28 PM CDT)   Home Meds    warfarin  :   warfarin ; Status:   Documented ; Ordered As Mnemonic:   warfarin 5 mg oral tablet ; Simple Display Line:   5 mg, 1 tab(s), Oral, daily, 0 Refill(s) ; Catalog Code:   warfarin ; Order Dt/Tm:   1/28/2019 2:10:03 PM          metoprolol  :   metoprolol ; Status:   Documented ; Ordered As Mnemonic:   metoprolol succinate 50 mg oral capsule, extended  release ; Simple Display Line:   50 mg, 1 cap(s), Oral, daily, 0 Refill(s) ; Catalog Code:   metoprolol ; Order Dt/Tm:   1/28/2019 2:09:51 PM

## 2022-02-15 NOTE — NURSING NOTE
Depression Screening Entered On:  8/4/2020 3:27 PM CDT    Performed On:  8/4/2020 3:27 PM CDT by Leona MORALES, Nan               Depression Screening   Little Interest - Pleasure in Activities :   Not at all   Feeling Down, Depressed, Hopeless :   Not at all   Initial Depression Screen Score :   0 Score   Poor Appetite or Overeating :   Not at all   Trouble Falling or Staying Asleep :   Several days   Feeling Tired or Little Energy :   Not at all   Feeling Bad About Yourself :   Not at all   Trouble Concentrating :   Not at all   Moving or Speaking Slowly :   Not at all   Thoughts Better Off Dead or Hurting Self :   Not at all   JADA Difficulty with Work, Home, Others :   Not difficult at all   Detailed Depression Screen Score :   1    Total Depression Screen Score :   1    Nan Delgadillo MA - 8/4/2020 3:27 PM CDT

## 2022-02-15 NOTE — TELEPHONE ENCOUNTER
---------------------  From: Alexa Phillips RN   To: INR Pool ( [id number]);     Sent: 1/24/2020 3:02:27 PM CST  Subject: Off Warfarin for now     Patient called stating she is off warfarin for now but is taking Lovenox.  She is scheduled for surgery on 1/27/20 to have a pelvic tumor removed.    She will come in or contact us if/when she needs to have an INR done if she resumes warfarin.    She will also let us know if she needs GTG to take over Metoprolol Rx.

## 2022-02-15 NOTE — NURSING NOTE
Anticoagulation Therapy Management Entered On:  7/1/2021 12:25 PM CDT    Performed On:  7/1/2021 12:02 PM CDT by Cristel Hernandez RN               Anticoagulation Visit Assessment   Type of Visit - Anticoagulation :   Face to face   Anticoagulation Indication :   Atrial fibrillation   Anticoagulant Duration :   Undetermined   Anticoagulation Medication Verified :   Yes   Cristel Hernandez RN - 7/1/2021 12:02 PM CDT   Anticoagulation Patient Assessment Grid   Change in Alcohol Consumption :   No   Change in Diet :   No   Change in Medications :   No   Diarrhea :   No   Rectal Bleeding :   No   Signs of Clotting :   No   Signs of Warfarin Intolerance :   No   Unusual Bleeding, Bruising :   No   Upcoming Procedures :   Yes   (Comment: Colonoscopy on 7/22/21 [Cristel Hernandez RN - 7/1/2021 12:02 PM CDT] )   Vomiting :   No   Cristel Hernandez RN - 7/1/2021 12:02 PM CDT   Patient on Warfarin :   Yes   Cristel Hernandez RN - 7/1/2021 12:02 PM CDT   Warfarin   Anticoagulant INR Goal Lower :   2    Anticoagulant INR Goal Upper :   3    Information Given by :   Patient   Sunday :   5 mg   Monday :   5 mg   Tuesday :   2.5 mg   Wednesday :   5 mg   Thursday :   5 mg   Friday :   5 mg   Saturday :   2.5 mg   Total Dose :   30 mg   Warfarin Pt Reported Previous Week Dose :    Sun Mon Tues Wed Thurs Fri Sat Weekly Total Dose   Week 1 5 mg 5 mg 2.5 mg 5 mg 5 mg 5 mg 2.5 mg 30 mg   Week 2  mg  mg  mg  mg  mg  mg  mg  mg   Week 3  mg  mg  mg  mg  mg  mg  mg  mg   Week 4  mg  mg  mg  mg  mg  mg  mg  mg         Patient is taking single or multiple strength tablet(s) :   Single strength tab(s)   One Tab Strength :   5 mg tab   Sunday :   5 mg   Monday :   2.5 mg   Tuesday :   5 mg   Wednesday :   2.5 mg   Thursday :   5 mg   Friday :   2.5 mg   Saturday :   5 mg   Week 1 Total Dose :   27.5 mg   Sunday :   1 tab(s)   Monday :   0.5 tab(s)   Tuesday :   1 tab(s)   Wednesday :   0.5 tab(s)   Thursday :   1 tab(s)   Friday :   0.5  tab(s)   Saturday :   1 tab(s)   Warfarin Dosing Acknowledgement :   I have reviewed the patient's warfarin dosing schedule and confirmed its accuracy for today's visit.   Patient Instructions :   INR = 4.2 per POC today. Patient advised to hold warfarin x 1 day then decrease warfarin to 2.5mg Mon, Wed, and Fri and 5mg the rest of the days of the week. Recheck INR in 7 - 10 days per protocol. Patient advised in office with verbal and written directions. Decreased warfarin 8.3 % per week today.     Charlee Hernandez RNzabeth - 7/1/2021 12:02 PM CDT

## 2022-02-15 NOTE — NURSING NOTE
Comprehensive Intake Entered On:  2/7/2020 2:09 PM CST    Performed On:  2/7/2020 1:59 PM CST by Emory Elizalde CMA               Summary   Chief Complaint :   Pt here for a Post Op visit for Total hysterectomy and bilateral salpingo oophorectomy done 1/27.  Pt states she is healing fine but is having increased SOB x 2 days.   Weight Measured :   122 lb(Converted to: 122 lb 0 oz, 55.34 kg)    Height Measured :   62 in(Converted to: 5 ft 2 in, 157.48 cm)    Body Mass Index :   22.31 kg/m2   Body Surface Area :   1.55 m2   Systolic Blood Pressure :   98 mmHg   Diastolic Blood Pressure :   80 mmHg   Mean Arterial Pressure :   86 mmHg   Peripheral Pulse Rate :   80 bpm   BP Site :   Right arm   Pulse Site :   Radial artery   Temperature Tympanic :   99.1 DegF(Converted to: 37.3 DegC)    Respiratory Rate :   26 br/min (HI)    Oxygen Saturation :   95 %   Emory Elizalde CMA - 2/7/2020 1:59 PM CST   Health Status   Allergies Verified? :   Yes   Medication History Verified? :   Yes   Medical History Verified? :   Yes   Pre-Visit Planning Status :   Not completed   Tobacco Use? :   Former smoker   Emory Elizalde CMA - 2/7/2020 1:59 PM CST   Meds / Allergies   (As Of: 2/7/2020 2:09:57 PM CST)   Allergies (Active)   penicillin  Estimated Onset Date:   Unspecified ; Reactions:   bumps on skin ; Created By:   Zahraa Castillo; Reaction Status:   Active ; Category:   Drug ; Substance:   penicillin ; Type:   Allergy ; Updated By:   Zahraa Castillo; Source:   Patient ; Reviewed Date:   2/7/2020 2:07 PM CST        Medication List   (As Of: 2/7/2020 2:09:57 PM CST)   Home Meds    warfarin  :   warfarin ; Status:   Documented ; Ordered As Mnemonic:   warfarin 5 mg oral tablet ; Simple Display Line:   2.5 mg, 0.5 tab(s), Oral, daily, 0 Refill(s) ; Catalog Code:   warfarin ; Order Dt/Tm:   1/28/2019 2:10:03 PM CST          enoxaparin  :   enoxaparin ; Status:   Documented ; Ordered As Mnemonic:   enoxaparin 60 mg/0.6 mL injectable solution  ; Simple Display Line:   Subcutaneous, q12 hrs, 0 Refill(s) ; Catalog Code:   enoxaparin ; Order Dt/Tm:   2/7/2020 2:05:05 PM CST          acetaminophen  :   acetaminophen ; Status:   Documented ; Ordered As Mnemonic:   Tylenol Extra Strength ; Simple Display Line:   Oral, q6 hrs, 0 Refill(s) ; Catalog Code:   acetaminophen ; Order Dt/Tm:   2/7/2020 2:05:47 PM CST          furosemide  :   furosemide ; Status:   Documented ; Ordered As Mnemonic:   furosemide 40 mg oral tablet ; Simple Display Line:   40 mg, 1 tab(s), Oral, daily, 0 Refill(s) ; Catalog Code:   furosemide ; Order Dt/Tm:   12/31/2019 10:26:55 AM CST          metoprolol  :   metoprolol ; Status:   Documented ; Ordered As Mnemonic:   metoprolol succinate 50 mg oral capsule, extended release ; Simple Display Line:   50 mg, 1 cap(s), Oral, daily, 0 Refill(s) ; Catalog Code:   metoprolol ; Order Dt/Tm:   1/28/2019 2:09:51 PM CST            Social History   Social History   (As Of: 2/7/2020 2:09:57 PM CST)   Tobacco:        Never (less than 100 in lifetime)   (Last Updated: 1/28/2019 2:11:09 PM CST by Nan Delgadillo MA)          Substance Abuse:        Never   (Last Updated: 1/28/2019 3:47:03 PM CST by Nan Delgadillo MA)          Employment/School:        Employed, 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   (Last Updated: 1/28/2019 3:47:30 PM CST by Nan Delgadlilo MA)          Home/Environment:        Marital status: .  Lives with Self.   (Last Updated: 1/28/2019 3:47:41 PM CST by Nan Delgadillo MA)          Exercise:        Exercise frequency: unknown frequency.   (Last Updated: 1/28/2019 3:48:10 PM CST by Nan Delgadillo MA)

## 2022-02-15 NOTE — RESULTS
Anticoagulation Therapy Entered On:  11/26/2019 2:12 PM CST    Performed On:  11/26/2019 2:08 PM CST by Cristel Hernandez RN               Warfarin Management   Week 1 Sunday Dose :   2.5    Week 1 Monday Dose :   2.5    Week 1 Tuesday Dose :   2.5    Week 1 Wednesday Dose :   2.5    Week 1 Thursday Dose :   5    Week 1 Friday Dose :   2.5    Week 1 Saturday Dose :   5    Week 1 Dose Total :   22.5    Week 2 Sunday Dose :   2.5    Week 2 Monday Dose :   2.5    Week 2 Tuesday Dose :   2.5    Week 2 Wednesday Dose :   2.5    Week 2 Thursday Dose :   5    Week 2 Friday Dose :   2.5    Week 2 Saturday Dose :   5    Week 2 Dose Total :   22.5    Planned Duration :   Indefinite   Indication :   Atrial fibrillation   Warfarin Management Comments :   [facility]  [address], [address]  [phone]  Capillary Specimen     International Normalization Ratio :   2.5    INR Range :   2.0 - 3.0   INR Therapeutic Range :   Yes   Warfarin Abnormal Findings :   none   Cristel Hernandez RN - 11/26/2019 2:09 PM CST   Warfarin Management and Results Grid   Signs of Thrombolic :   No   Signs of Warfarin Intolerance :   No   Changes in Diet or Alcohol Intake :   No   Changes in Medication or Antibiotics :   No   Unusual Bleeding or Bruising :   No   Rectal Bleeding or Black Stools :   No   Cristel Hernandez RN - 11/26/2019 2:09 PM CST   Anticoagulation Recheck :   4 weeks   Warfarin Physician :   Kirk Agarwal MD Special Instructions :   INR = 2.5 per POC Patient is to stay on 5mg Thurs and Sat and 2.5mg the rest of the days of the week. Recheck INR in 4 weeks per protocol. Patient advised in office.   Cristel Hernandez RN - 11/26/2019 2:09 PM CST

## 2022-02-15 NOTE — LETTER
(Inserted Image. Unable to display)   February 27, 2019      MARNINAYAN SANTANA  [address] [place] [address] [phone]        Dear MARNI,      Thank you for selecting [facility] for your healthcare needs.      Your recent home heart monitor did not show any episodes of atrial fibrillation.          Please contact me or my assistant at [phone] if you have any questions or concerns.     Sincerely,        Kirk Agarwal MD  
no

## 2022-02-15 NOTE — PROGRESS NOTES
Patient:   MARNI SANTANA            MRN: [id number]            FIN: [id number]               Age:   69 years     Sex:  Female     :  1950   Associated Diagnoses:   PE - Pulmonary embolism   Author:   Kirk Agarwal MD      Chief Complaint   2020 3:54 PM CDT     f/u chemo.  preop--surgery 2020 at [facility]/ Dr. Cris Burnett for filter removal from aorta.      Preoperative Information   Indication for surgery:  removal of IVC filter.    Accompanied by:  No one.    Source of history:  Self, Medical record.           Review of Systems   Constitutional:  No fever, No chills, No sweats, No weakness, No fatigue.    Eye:  No recent visual problem.    Ear/Nose/Mouth/Throat:  No decreased hearing, No nasal congestion, No sore throat.    Respiratory:  No shortness of breath, No cough.    Cardiovascular:  Negative, No chest pain, No palpitations, No peripheral edema.    Gastrointestinal:  No nausea, No vomiting, No diarrhea, No constipation, No heartburn.    Genitourinary:  No dysuria, No change in urine stream.    Hematology/Lymphatics:  No bruising tendency, No bleeding tendency.    Endocrine:  No cold intolerance, No heat intolerance.    Immunologic:  Negative.    Musculoskeletal:  No back pain, No neck pain, No joint pain, No muscle pain.    Integumentary:  No rash, No dryness, No skin lesion.    Neurologic:  Alert and oriented X4, No headache.    Psychiatric:  No anxiety, No depression.       Health Status   Allergies:    Allergic Reactions (Selected)  Severity Not Documented  Penicillin (Bumps on skin)   Medications:  (Selected)   Prescriptions  Prescribed  furosemide 40 mg oral tablet: = 1 tab(s) ( 40 mg ), Oral, daily, # 90 tab(s), 1 Refill(s), Type: Maintenance, Pharmacy: [id number], 1 tab(s) Oral daily,x90 day(s)  Documented Medications  Documented  Daily Multiple Vitamins: Oral, daily, 0 Refill(s), Type: Maintenance  enoxaparin 60 mg/0.6 mL injectable solution: Subcutaneous, q12  hrs, 0 Refill(s), Type: Maintenance  metoprolol succinate 50 mg oral capsule, extended release: = 0.5 cap(s) ( 25 mg ), Oral, daily, 0 Refill(s), Type: Maintenance   Problem list:    All Problems  Hypertension / SNOMED CT 0339248221 / Confirmed  PE - Pulmonary embolism / SNOMED CT 3283358283 / Confirmed  Warfarin anticoagulation / SNOMED CT 88269384 / Confirmed  DVT - Deep vein thrombosis / SNOMED CT 5099581322 / Confirmed  Ovarian cancer / SNOMED CT 1127328902 / Confirmed  Atrial fibrillation / SNOMED CT 77656346 / Confirmed  Resolved: Inpatient stay / SNOMED CT 357386916  Resolved: Inpatient stay / SNOMED CT 437937430      Histories   Past Medical History:    Resolved  Inpatient stay ([id number]): Onset on 2/7/2020 at 69 years.  Resolved on 2/9/2020 at 69 years.  Comments:  3/5/2020 CST 11:37 AM CST - [facility], [place] - Right pleural effusion.  Inpatient stay ([id number]): Onset on 1/10/2020 at 69 years.  Resolved on 1/14/2020 at 69 years.  Comments:  1/31/2020 CST 10:58 AM CST - [facility], [place] - Blood clots in lung, legs, and pelvic mass.   Family History:    Atrial fibrillation  Brother  HTN - Hypertension  Father     Procedure history:    Exploratory laparotomy (SNOMED CT 339192585) on 1/27/2020 at 69 Years.  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  Comments:  1/29/2020 10:09 AM CST -  Nan Delgadillo MA  Bilateral dissection.   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  Breast implant (SNOMED CT 3523467863).   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      Home and Environment Assessment            Marital status: .  Lives with Self.      Exercise and Physical Activity Assessment            Exercise frequency: unknown frequency.       Has a history of anemia.  Has a history of DVT or pulmonary embolism.  Has no personal history of bleeding problems.   Has no personal or family history of anesthesia reactions.  Patient does not have active tuberculosis.    S/he has not taken aspirin or aspirin containing products in the last week.     S/he has not taken Plavix (Clopidogrel) in the last 2 weeks.    S/he has not taken warfarin in the past week.    S/he has not been on corticosteroids for more than 2 weeks recently.      S/he is not DNR before, during or after surgery.    Chest pain / SOB walking up 2 flights of steps:  no  Pain in neck or jaw:  no  CAD MI:  no  Afib:  yes  Heart Failure:  no  Asthma  or Bronchitis:  no  Diabetes:  no   Seizure Disorder:  no  CKD:  no  Thyroid Disease:  no  Liver Disease:  no  CVA:  no         Physical Examination   Vital Signs   6/8/2020 3:54 PM CDT Temperature Tympanic 98.6 DegF    Peripheral Pulse Rate 70 bpm    Pulse Site Radial artery    HR Method Manual    Systolic Blood Pressure 128 mmHg    Diastolic Blood Pressure 80 mmHg    Mean  Arterial Pressure 96 mmHg    BP Site Right arm    BP Method Manual    Oxygen Saturation 97 %      Measurements from flowsheet : Measurements   6/8/2020 3:54 PM CDT Height Measured - Standard 62 in    Weight Measured - Standard 125 lb    BSA 1.57 m2    Body Mass Index 22.86 kg/m2      General:  Alert and oriented, No acute distress.    Eye:  Pupils are equal, round and reactive to light, Extraocular movements are intact, Normal conjunctiva.    HENT:  Normocephalic, Tympanic membranes are clear, Oral mucosa is moist, No pharyngeal erythema, No sinus tenderness.    Neck:  Supple, Non-tender, No carotid bruit, No lymphadenopathy, No thyromegaly.    Respiratory:  Lungs are clear to auscultation, Respirations are non-labored, Breath sounds are equal, No chest wall tenderness.    Cardiovascular:  Normal rate, Regular rhythm, No murmur, No gallop, Normal peripheral perfusion, No edema.    Gastrointestinal:  Soft, Non-tender, No organomegaly.    Musculoskeletal:  Normal range of motion, Normal strength, No swelling, No deformity.    Integumentary:  Warm, Dry, No rash.    Neurologic:  Alert, Oriented, No focal deficits.    Psychiatric:  Cooperative, Appropriate mood & affect.       Review / Management               Impression and Plan   Diagnosis     Preoperative clearance (KSD17-XM Z01.818).     PE - Pulmonary embolism (CXH35-VH I26.99).     Condition:  Stable, The patient is cleared for sedation and the procedure..

## 2022-02-15 NOTE — RESULTS
Anticoagulation Therapy Entered On:  9/6/2019 9:26 AM CDT    Performed On:  9/6/2019 9:23 AM CDT by Cristel Hernandez RN               Warfarin Management   Week 1 Sunday Dose :   2.5    Week 1 Monday Dose :   2.5    Week 1 Tuesday Dose :   2.5    Week 1 Wednesday Dose :   2.5    Week 1 Thursday Dose :   5    Week 1 Friday Dose :   2.5    Week 1 Saturday Dose :   5    Week 1 Dose Total :   22.5    Week 2 Sunday Dose :   2.5    Week 2 Monday Dose :   2.5    Week 2 Tuesday Dose :   2.5    Week 2 Wednesday Dose :   2.5    Week 2 Thursday Dose :   5    Week 2 Friday Dose :   2.5    Week 2 Saturday Dose :   5    Week 2 Dose Total :   22.5    Planned Duration :   Indefinite   Indication :   Atrial fibrillation   Warfarin Management Comments :   [facility] Office  [address], [address]  [phone]  Capillary Specimen     International Normalization Ratio :   2.6    INR Range :   2.0 - 3.0   INR Therapeutic Range :   Yes   Warfarin Abnormal Findings :   none   Cristel Hernandez RN - 9/6/2019 9:24 AM CDT   Warfarin Management and Results Grid   Signs of Thrombolic :   No   Signs of Warfarin Intolerance :   No   Changes in Diet or Alcohol Intake :   No   Changes in Medication or Antibiotics :   No   Unusual Bleeding or Bruising :   No   Rectal Bleeding or Black Stools :   No   Cristel Hernandez RN - 9/6/2019 9:24 AM CDT   Anticoagulation Recheck :   4 weeks   Warfarin Physician :   Kirk Agarwal MD Special Instructions :   INR = 2.6 per POC Patient is to stay on 5mg warfarin on Thurs and Sat and 2.5mg the rest of the days of the week Recheck INR in 4 weeks per protocol. Patient advised in office.   Cristel Hernandez RN - 9/6/2019 9:24 AM CDT

## 2022-02-15 NOTE — LETTER
(Inserted Image. Unable to display)   January 06, 2020        MARNI SANTANA      [address]   [place], [place] [id number]        Dear MARNI,    Thank you for selecting [facility] for your health care needs.  Below you will find the results of your recent test(s) done at our clinic.     Your tests look good.      Result Name Current Result Previous Result Reference Range   Sodium Level (mmol/L)  137 1/2/2020  141 12/26/2019 135 - 146   Potassium Level (mmol/L)  4.6 1/2/2020  4.4 12/26/2019 3.5 - 5.3   Chloride Level (mmol/L) ((L)) 96 1/2/2020  107 12/26/2019 98 - 110   CO2 Level (mmol/L)  28 1/2/2020  26 12/26/2019 20 - 32   Glucose Level (mg/dL) ((H)) 109 1/2/2020  90 12/26/2019 65 - 99   BUN (mg/dL) ((H)) 29 1/2/2020  12 12/26/2019 7 - 25   Creatinine Level (mg/dL) ((H)) 1.11 1/2/2020  0.76 12/26/2019 0.60 - 0.93   BUN/Creat Ratio ((H)) 26 1/2/2020  16 12/26/2019 6 - 22   eGFR (mL/min/1.73m2) ((L)) 50 1/2/2020  > OR = 60 -    Calcium Level (mg/dL)  9.3 1/2/2020  9.1 12/26/2019 8.6 - 10.4       Please contact me or my assistant at [phone] if you have any questions about your results.    Sincerely,        Jann Agarwal MD        What do your labs mean?  Below is a glossary of commonly ordered labs:  LDL   Bad Cholesterol   HDL   Good Cholesterol  AST/ALT   Liver Function   Cr/Creatinine   Kidney Function  Microalbumin   Kidney Function  BUN   Kidney Function  PSA   Prostate    TSH   Thyroid Hormone  HgbA1c   Diabetes Test   Hgb (Hemoglobin)   Red Blood Cells

## 2022-02-15 NOTE — NURSING NOTE
Quick Intake Entered On:  8/1/2019 10:06 AM CDT    Performed On:  8/1/2019 10:06 AM CDT by Maddy Stevens RN               Summary   Chief Complaint :   Bp check   Systolic Blood Pressure :   108 mmHg   Diastolic Blood Pressure :   62 mmHg   Mean Arterial Pressure :   77 mmHg   BP Site :   Right arm   Pulse Site :   Radial artery   BP Method :   Manual   HR Method :   Manual   Maddy Stevens RN - 8/1/2019 10:06 AM CDT

## 2022-02-15 NOTE — RESULTS
Anticoagulation Therapy Entered On:  10/2/2019 9:16 AM CDT    Performed On:  10/2/2019 9:11 AM CDT by Cristel Hernandez RN               Warfarin Management   Week 1 Sunday Dose :   2.5    Week 1 Monday Dose :   2.5    Week 1 Tuesday Dose :   2.5    Week 1 Wednesday Dose :   2.5    Week 1 Thursday Dose :   5    Week 1 Friday Dose :   2.5    Week 1 Saturday Dose :   5    Week 1 Dose Total :   22.5    Week 2 Sunday Dose :   2.5    Week 2 Monday Dose :   2.5    Week 2 Tuesday Dose :   2.5    Week 2 Wednesday Dose :   2.5    Week 2 Thursday Dose :   5    Week 2 Friday Dose :   2.5    Week 2 Saturday Dose :   5    Week 2 Dose Total :   22.5    Planned Duration :   Indefinite   Indication :   Atrial fibrillation   Warfarin Management Comments :   [facility] Office  [address], [address]  [phone]  Capillary Specimen     International Normalization Ratio :   3.6    INR Range :   2.0 - 3.0   INR Therapeutic Range :   No   Warfarin Abnormal Findings :   has fallen off eating greens which she usually enjoys. Discuss consistancy in diet.    Cristel Hernandez RN - 10/2/2019 9:12 AM CDT   Warfarin Management and Results Grid   Changes in Diet or Alcohol Intake :   Yes   Changes in Medication or Antibiotics :   No   Unusual Bleeding or Bruising :   No   Rectal Bleeding or Black Stools :   No   Cristel Hernandez RN - 10/2/2019 9:12 AM CDT   Anticoagulation Recheck :   2 weeks   Warfarin Physician :   Kirk Agarwal MD Special Instructions :   INR = 3.6 per POC Patient is to stay on 5mg warfarin on Thurs and Sat and 2.5mg the rest of the days of the week recheck INR in 4 weeks per protocol. Patient advised in office.   Cristel Hernandez RN - 10/2/2019 9:12 AM CDT

## 2022-02-15 NOTE — NURSING NOTE
CAGE Assessment Entered On:  8/4/2020 3:27 PM CDT    Performed On:  8/4/2020 3:27 PM CDT by Nan Delgadillo MA               Assessment   Have you ever felt you should cut down on your drinking :   No   Have people annoyed you by criticizing your drinking :   No   Have you ever felt bad or guilty about your drinking :   No   Have you ever taken a drink first thing in the morning to steady your nerves or get rid of a hangover (Eye-opener) :   No   CAGE Score :   0    Nan Delgadillo MA - 8/4/2020 3:27 PM CDT

## 2022-02-15 NOTE — NURSING NOTE
Anticoagulation Therapy Management Entered On:  11/6/2020 11:07 AM CST    Performed On:  11/6/2020 11:01 AM CST by Cristel Hernandez RN               Anticoagulation Visit Assessment   Type of Visit - Anticoagulation :   Face to face   Cristel Hernandez RN - 11/6/2020 11:01 AM CST   Anticoagulation Patient Assessment Grid   Change in Alcohol Consumption :   No   Change in Diet :   No   Change in Medications :   No   Diarrhea :   No   Rectal Bleeding :   No   Signs of Clotting :   No   Signs of Warfarin Intolerance :   No   Unusual Bleeding, Bruising :   No   Upcoming Procedures :   No   Vomiting :   No   Cristel Hernandez RN - 11/6/2020 11:01 AM CST   Patient on Warfarin :   Yes   Patient on Other Anticoagulant :   No   Cristel Hernandez RN - 11/6/2020 11:01 AM CST   Warfarin   International Normalization Ratio TR :   2.1    Information Given by :   Patient   Sunday :   2.5 mg   Monday :   5 mg   Tuesday :   2.5 mg   Wednesday :   2.5 mg   Thursday :   2.5 mg   Friday :   5 mg   Saturday :   5 mg   Total Dose :   25 mg   Warfarin Pt Reported Previous Week Dose :    Sun Mon Tues Wed Thurs Fri Sat Weekly Total Dose   Week 1 2.5 mg 2.5 mg 2.5 mg 2.5 mg 2.5 mg 2.5 mg 2.5 mg 17.5 mg   Week 2  mg  mg  mg  mg  mg  mg  mg  mg   Week 3  mg  mg  mg  mg  mg  mg  mg  mg   Week 4  mg  mg  mg  mg  mg  mg  mg  mg         Sunday :   2.5 mg   Monday :   5 mg   Tuesday :   2.5 mg   Wednesday :   5 mg   Thursday :   2.5 mg   Friday :   5 mg   Saturday :   2.5 mg   Week 1 Total Dose :   25 mg   Patient Instructions :   INR = 2.1 per [facility] POC today. Patient is to take warfarin 5mg Mon, Wed, Fri and 2.5mg the rest of the days of the week. Recheck INR on 11/10/20. Advised in office.     Cristel Hernandez RN - 11/6/2020 11:01 AM CST   Medication History   Medication List   (As Of: 11/6/2020 11:07:31 AM CST)   Prescription/Discharge Order    alendronate  :   alendronate ; Status:   Prescribed ; Ordered As Mnemonic:   alendronate 35 mg oral  tablet ; Simple Display Line:   35 mg, 1 tab(s), Oral, qweek, 12 tab(s), 3 Refill(s) ; Ordering Provider:   Kirk Agarwal MD; Catalog Code:   alendronate ; Order Dt/Tm:   8/4/2020 2:58:12 PM CDT          furosemide  :   furosemide ; Status:   Prescribed ; Ordered As Mnemonic:   furosemide 40 mg oral tablet ; Simple Display Line:   40 mg, 1 tab(s), Oral, daily, for 90 day(s), 90 tab(s), 1 Refill(s) ; Ordering Provider:   Kirk Agarwal MD; Catalog Code:   furosemide ; Order Dt/Tm:   5/4/2020 3:53:00 PM CDT          warfarin  :   warfarin ; Status:   Prescribed ; Ordered As Mnemonic:   warfarin 5 mg oral tablet ; Simple Display Line:   5 mg, 1 tab(s), Oral, daily, 30 tab(s), 0 Refill(s) ; Ordering Provider:   Kirk Agarwal MD; Catalog Code:   warfarin ; Order Dt/Tm:   10/30/2020 9:47:39 AM CDT            Home Meds    flecainide  :   flecainide ; Status:   Documented ; Ordered As Mnemonic:   flecainide 50 mg oral tablet ; Simple Display Line:   50 mg, 1 tab(s), Oral, q12 hrs, 60 tab(s), 0 Refill(s) ; Catalog Code:   flecainide ; Order Dt/Tm:   11/2/2020 1:54:35 PM CST          metoprolol  :   metoprolol ; Status:   Documented ; Ordered As Mnemonic:   metoprolol succinate 50 mg oral capsule, extended release ; Simple Display Line:   25 mg, 0.5 cap(s), Oral, daily, 0 Refill(s) ; Catalog Code:   metoprolol ; Order Dt/Tm:   1/28/2019 2:09:51 PM CST          multivitamin  :   multivitamin ; Status:   Documented ; Ordered As Mnemonic:   Daily Multiple Vitamins ; Simple Display Line:   Oral, daily, 0 Refill(s) ; Catalog Code:   multivitamin ; Order Dt/Tm:   2/10/2020 9:18:09 AM CST

## 2022-02-15 NOTE — RESULTS
Anticoagulation Therapy Entered On:  2/5/2019 3:15 PM CST    Performed On:  2/5/2019 3:06 PM CST by Cristel Hernandez RN               Warfarin Management   Week 1 Sunday Dose :   2.5    Week 1 Monday Dose :   2.5    Week 1 Tuesday Dose :   2.5    Week 1 Wednesday Dose :   2.5    Week 1 Thursday Dose :   2.5    Week 1 Friday Dose :   2.5    Week 1 Saturday Dose :   2.5    Week 1 Dose Total :   17.5    Planned Duration :   Indefinite   Indication :   Atrial fibrillation   Warfarin Management Comments :   [facility] Office  [address], [address]  [phone]  Capillary Specimen     International Normalization Ratio :   2.6    INR Range :   2.0 - 3.0   INR Therapeutic Range :   Yes   Warfarin Abnormal Findings :   none   Cristel Hernandez RN - 2/5/2019 3:13 PM CST   Warfarin Management and Results Grid   Signs of Thrombolic :   No   Signs of Warfarin Intolerance :   No   Changes in Diet or Alcohol Intake :   No   Changes in Medication or Antibiotics :   No   Unusual Bleeding or Bruising :   No   Rectal Bleeding or Black Stools :   No   Cristel Hernandez RN - 2/5/2019 3:13 PM CST   Anticoagulation Recheck :   1 week   Warfarin Physician :   Kirk Agarwal MD Special Instructions :   INR = 2.6 per POC Patient is to stay on 2.5mg warfarin daily and recheck INR in 5-7 days per GTG. Patient advised in office.   Cristel Hernandez RN - 2/5/2019 3:13 PM CST

## 2022-02-15 NOTE — LETTER
(Inserted Image. Unable to display)   August 10, 2020        MARNI SANTANA      [address]   [place], [place] [id number]        Dear MARNI,    Thank you for selecting [facility] for your health care needs.  Below you will find the results of your recent test(s) done at our clinic.     The test was negative for blood in the stool.  Please repeat in one year.      Result Name Current Result Previous Result   Fecal Globin  See comment 8/8/2020  See comment 5/23/2019       Please contact me or my assistant at [phone] if you have any questions about your results.    Sincerely,        Jann Agarwal MD        What do your labs mean?  Below is a glossary of commonly ordered labs:  LDL   Bad Cholesterol   HDL   Good Cholesterol  AST/ALT   Liver Function   Cr/Creatinine   Kidney Function  Microalbumin   Kidney Function  BUN   Kidney Function  PSA   Prostate    TSH   Thyroid Hormone  HgbA1c   Diabetes Test   Hgb (Hemoglobin)   Red Blood Cells

## 2022-02-15 NOTE — NURSING NOTE
PT/INR POC Entered On:  9/8/2021 12:21 PM CDT    Performed On:  9/8/2021 12:21 PM CDT by Yanet March               PT/INR POC   INR POC :   3.1    Reference Range - INR POC :   2.0-3.0   Yanet March - 9/8/2021 12:21 PM CDT   Details   Collection Date :   9/8/2021 12:21 PM CDT   Expiration Date :   7/31/2022 CDT   Lot#/Manufacture :   [id number]   Handling Specimen POC :   capillary    :   Yanet Bray - 9/8/2021 12:21 PM CDT

## 2022-02-15 NOTE — PROGRESS NOTES
Chief Complaint    Annual physical  History of Present Illness      Patient here for annual physical. Diagnosed with Atrial fibrillation this year and would like to discuss different treatment options. Had fast and hard heart rate in the past and then was short of breath after episode. Before this year has not been to see a health care provider for more then 10 years.      Eats a balanced meal, does have protein supplements. Has an active life style.          Weight: Stable          Hx of abnormal paps:               Most recent mammogram:  Due          Colon cancer screening status:  Due          Dexa scan:  Due          Last Dental Exam:  Due          Last Eye Exam: Due          Immunizations:  declines             Review of Systems      Constitutional:  No fever, No chills, No sweats, No weakness, No fatigue.            Eye:  No recent visual problem.            Ear/Nose/Mouth/Throat:  No decreased hearing, No nasal congestion, No sore throat.            Respiratory:  No shortness of breath, No cough.            Cardiovascular:  No chest pain, No palpitations, No peripheral edema.            Gastrointestinal:  No nausea, No vomiting, No diarrhea, No constipation, No heartburn.            Genitourinary:  No dysuria, No change in urine stream.            Hematology/Lymphatics:  No bruising tendency, No bleeding tendency.            Endocrine:  No cold intolerance, No heat intolerance.            Musculoskeletal:  No back pain, No neck pain, No joint pain, No muscle pain.            Integumentary:  No rash.            Neurologic:  Alert and oriented X4, No headache.            Psychiatric:  No anxiety, No depression.            All other systems were reviewed and are negative         Physical Exam   Vitals & Measurements    T: 97.3   F (Tympanic)  HR: 62(Peripheral)  BP: 128/82     HT: 62 in  WT: 122 lb  BMI: 22.31       General: Alert and oriented, No acute distress.      Eye: Pupils are equal, round and reactive  to light, Extraocular movements are intact, Normal conjunctiva.      HENT: Normocephalic, Tympanic membranes are clear, Oral mucosa is moist, No pharyngeal erythema, No sinus tenderness.      Neck: Supple, Non-tender, No carotid bruit, No lymphadenopathy, No thyromegaly.      Respiratory: Lungs are clear to auscultation, Respirations are non-labored, Breath sounds are equal, No chest wall tenderness.      Cardiovascular: Normal rate, Regular rhythm, No murmur, No gallop, Normal peripheral perfusion, No edema.      Gastrointestinal: Soft, Non-tender, Normal bowel sounds, No organomegaly.      Genitourinary: No costovertebral angle tenderness.      Musculoskeletal: Normal range of motion, Normal strength, No swelling, No deformity.      Integumentary: Warm, Dry, No rash.      Neurologic: Alert, Oriented, Normal sensory, Normal motor function, No focal deficits, Normal deep tendon reflexes.      Psychiatric: Cooperative, Appropriate mood & affect.         Assessment/Plan       1. Annual physical exam (Z00.00)         Discussed fall risks and to keep active daily. Advised to have mammo and Dexa. RTC for lab appointment for Lipids and Glucose. FIT card given.         Ordered:          Return to Clinic (Request), RFV: Yearly exam/physical, Return in 1 year          Return to Clinic (Request), RFV: Lab visit only, Return in Soon, Instructions: Fasting labs, lipid and glucose for screeening per GTG                2. Atrial fibrillation (I48.91)         Cardio consult. Referral ordered. Dr Edward Rosas.         Ordered:          DEXA Scan (Request), Hypertension  Atrial fibrillation          Referral (Request), 04/18/19 14:57:00 CDT, Referred to: Cardiology, Referred to: Dr Edward Rosas, Hypertension  Atrial fibrillation                3. Hypertension (I10)        Stable, continue on current medication.         Ordered:          DEXA Scan (Request), Hypertension  Atrial fibrillation                Orders:      Alison BE  Chivo ARTEAGA, acted solely as a scribe for, and in the presence of Dr. Kirk Agarwal who performed the services.  Patient Information     Name:MARNI SANTANA      Address:      [address] [place] [id number]     Sex:Female     YOB: 1950     [phone]     Emergency Contact:ABBIE MENDEZ     MRN:[id number]     FIN:[id number]     Location:[facility]     Date of Service:04/18/2019      Primary Care Physician:       NONE ,       Attending Physician:       Kirk Agarwal MD, [phone]  Problem List/Past Medical History    Ongoing     Atrial fibrillation     Hypertension     Warfarin anticoagulation    Historical     No qualifying data  Medications     metoprolol succinate 50 mg oral capsule, extended release: 50 mg, 1 cap(s), Oral, daily, 0 Refill(s).     warfarin 5 mg oral tablet: 5 mg, 1 tab(s), Oral, daily, 0 Refill(s).          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 Lives with Self., 01/28/2019     Substance Abuse      Never, 01/28/2019     Tobacco      Never (less than 100 in lifetime), 01/28/2019  Family History    Atrial fibrillation: Brother.    HTN - Hypertension: Father.  Lab Results       Lab Results (Last 4 results within 90 days)        Protime: 19.3 High [11.9  - 13.9] (03/14/19 14:40:00)       Protime: 27.4 High [11.9  - 13.9] (02/01/19 11:15:00)       Protime: 32.1 High [11.9  - 13.9] (01/29/19 13:58:00)       Protime: 43.5 High [11.9  - 13.9] (01/28/19 15:31:00)       INR: 1.4 (04/04/19 14:52:00)       INR: 1.7 High (03/14/19 14:40:00)       INR: 2 (02/21/19 16:09:00)       INR: 2.6 (02/08/19 09:06:00)

## 2022-02-15 NOTE — NURSING NOTE
Quick Intake Entered On:  10/31/2019 9:12 AM CDT    Performed On:  10/31/2019 9:12 AM CDT by Alexa Phillips RN               Summary   Chief Complaint :   BP   Systolic Blood Pressure :   136 mmHg (HI)    Diastolic Blood Pressure :   82 mmHg (HI)    Mean Arterial Pressure :   100 mmHg   BP Site :   Right arm   BP Method :   Manual   Alexa Phillips RN - 10/31/2019 9:12 AM CDT

## 2022-02-15 NOTE — NURSING NOTE
PT/INR POC Entered On:  5/6/2021 8:35 AM CDT    Performed On:  5/6/2021 8:32 AM CDT by Joan Greer               PT/INR POC   INR POC :   1.9    Reference Range - INR POC :   2.0-3.0   Joan Greer - 5/6/2021 8:32 AM CDT   Details   Collection Date :   5/6/2021 8:34 AM CDT   Expiration Date :   4/30/2021 CDT   Lot#/Manufacture :   [id number]   Handling Specimen POC :   capillary    :   Joan Shay - 5/6/2021 8:32 AM CDT

## 2022-02-15 NOTE — NURSING NOTE
Anticoagulation Therapy Management Entered On:  9/8/2021 1:48 PM CDT    Performed On:  9/8/2021 1:47 PM CDT by Alexa Phillips RN               Anticoagulation Visit Assessment   Type of Visit - Anticoagulation :   Telephone   Anticoagulation Indication :   Atrial fibrillation   Anticoagulant Duration :   Undetermined   Anticoagulation Medication Verified :   Yes   Alexa Phillips RN - 9/8/2021 1:47 PM CDT   Anticoagulation Patient Assessment Grid   Change in Alcohol Consumption :   No   Change in Diet :   No   Change in Medications :   No   Diarrhea :   No   Rectal Bleeding :   No   Signs of Clotting :   No   Signs of Warfarin Intolerance :   No   Unusual Bleeding, Bruising :   No   Upcoming Procedures :   No   Vomiting :   No   Alexa Phillips RN - 9/8/2021 1:47 PM CDT   Patient on Warfarin :   Yes   Alexa Phillips RN - 9/8/2021 1:47 PM CDT   Warfarin   Anticoagulant INR Goal Lower :   2    Anticoagulant INR Goal Upper :   3    Sunday :   5 mg   Monday :   2.5 mg   Tuesday :   5 mg   Wednesday :   2.5 mg   Thursday :   5 mg   Friday :   2.5 mg   Saturday :   5 mg   Total Dose :   27.5 mg   Warfarin Pt Reported Previous Week Dose :    Sun Mon Tues Wed Thurs Fri Sat Weekly Total Dose   Week 1 5 mg 2.5 mg 5 mg 2.5 mg 5 mg 2.5 mg 5 mg 27.5 mg   Week 2  mg  mg  mg  mg  mg  mg  mg  mg   Week 3  mg  mg  mg  mg  mg  mg  mg  mg   Week 4  mg  mg  mg  mg  mg  mg  mg  mg         Patient is taking single or multiple strength tablet(s) :   Single strength tab(s)   One Tab Strength :   5 mg tab   Sunday :   5 mg   Monday :   2.5 mg   Tuesday :   5 mg   Wednesday :   2.5 mg   Thursday :   5 mg   Friday :   2.5 mg   Saturday :   5 mg   Week 1 Total Dose :   27.5 mg   Sunday :   1 tab(s)   Monday :   0.5 tab(s)   Tuesday :   1 tab(s)   Wednesday :   0.5 tab(s)   Thursday :   1 tab(s)   Friday :   0.5 tab(s)   Saturday :   1 tab(s)   Warfarin Dosing Acknowledgement :   I have reviewed the patient's warfarin dosing schedule and  confirmed its accuracy for today's visit.   Patient Instructions :   POC INR = 3.1. Per protocol Continue warfarin   2.5mg Mon/ Wed/ Fri  5mg ROW  Recheck 2-4 weeks weeks. Pt notified by phone.     Jacqueline OLVERA, Alexa YOUNG - 9/8/2021 1:47 PM CDT

## 2022-02-15 NOTE — TELEPHONE ENCOUNTER
---------------------From: Cris Cleary LPN (Phone Messages Pool ([id number]_[facility])) To: Tanya Mcgrath MD;   Sent: 1/10/2020 10:24:10 AM CSTSubject: Radiology Results Phone MessagePCP: GTGTime of call: 10:13am message leftPerson calling: Rosamaria? - [facility]Contact # : [phone]: Calling with some results. Would like to speak with the provider - GTG is not in clinic today.Last visit/reason: 1/2/20 - hospital f/u; pleural effusionCalled radiologist back and received CT results.Patient to come in for appointment today with a .Tanya Mcgrath MD

## 2022-02-15 NOTE — NURSING NOTE
PT/INR POC Entered On:  8/18/2021 3:05 PM CDT    Performed On:  8/18/2021 3:04 PM CDT by Moris Lowe               PT/INR POC   INR POC :   3.0    Reference Range - INR POC :   2.0-3.0   Moris Lowe - 8/18/2021 3:04 PM CDT   Details   Collection Date :   8/18/2021 3:04 PM CDT   Expiration Date :   7/31/2022 CDT   Lot#/Manufacture :   [id number]   Handling Specimen POC :   Capillary    :   Moris Love - 8/18/2021 3:04 PM CDT

## 2022-02-15 NOTE — NURSING NOTE
"Patient came in for INR today and stated she has been having coughing fits for a month or more every time she bends over or lays in bed. She denied fever, chills, weight gain/loss, dizziness, chest pain. She does cough up sputum sometimes but it has no color. She sometimes feels SOB but it doesn't last long. Sometimes she gets a \"pressure\" under her ribs through to her back but it is not painful. I listened to her lungs and right lower lobe had diminished sound.  I advised she see a provider soon, by the end of this week. I also discussed with her s/s to watch for that would be reason to go to ER. Patient expressed understanding and has appt with JOSTIN on 12/26/19  "

## 2022-02-15 NOTE — NURSING NOTE
Anticoagulation Therapy Entered On:  3/14/2019 3:38 PM CDT    Performed On:  3/14/2019 3:37 PM CDT by Alexa Phillips RN               Warfarin Management   Week 1 Sunday Dose :   2.5    Week 1 Monday Dose :   2.5    Week 1 Tuesday Dose :   2.5    Week 1 Wednesday Dose :   2.5    Week 1 Thursday Dose :   2.5    Week 1 Friday Dose :   2.5    Week 1 Saturday Dose :   2.5    Week 1 Dose Total :   17.5    Week 2 Sunday Dose :   2.5    Week 2 Monday Dose :   2.5    Week 2 Tuesday Dose :   2.5    Week 2 Wednesday Dose :   2.5    Week 2 Thursday Dose :   2.5    Week 2 Friday Dose :   2.5    Week 2 Saturday Dose :   2.5    Week 2 Dose Total :   17.5    Indication :   Atrial fibrillation   Warfarin Management Comments :   Lab test performed by:  [facility]  [address] [place] [address]  Phone # [phone]  Fax# [phone]   INR Range :   2.0 - 3.0   INR Therapeutic Range :   No   Alexa Phillips RN - 3/14/2019 3:37 PM CDT   Warfarin Management and Results Grid   Signs of Thrombolic :   No   Signs of Warfarin Intolerance :   No   Changes in Diet or Alcohol Intake :   No   Changes in Medication or Antibiotics :   No   Unusual Bleeding or Bruising :   No   Rectal Bleeding or Black Stools :   No   Vitamin K Food Handout :   No   Heart Valve Replacement :   No   Alexa Phillips RN - 3/14/2019 3:37 PM CDT   Anticoagulation Recheck :   2 weeks   Warfarin Special Instructions :   Lab INR = 1.7; per BRYAN patient to continue warfarin 2.5 mg daily; recheck 2 weeks; notified in office by Alexa Bernal RN - 3/14/2019 3:37 PM CDT

## 2022-02-15 NOTE — PROGRESS NOTES
Chief Complaint    med check  History of Present Illness        Patient is here for follow up on her medications.  She has a successful ablation procedure in December of last year and is off flecainide.  She is anticoagulated with therapeutic INR.  Denies any episodes of atrial fibrillation.  She is due for oncology follow up next month.  Review of Systems          ROS reviewed and negative except for symptoms noted in HPI.  Physical Exam   Vitals & Measurements    T: 97.5  F (Tympanic)  HR: 74 (Peripheral)  BP: 126/82  SpO2: 99%     HT: 62 in  WT: 135.9 lb  BMI: 24.85             General:  Alert and oriented, No acute distress.             Eye: Normal conjunctiva.             HENT:  Oral mucosa is moist.             Neck:  Supple.             Respiratory:  Respirations are non-labored.             Cardiovascular:  RRR           Musculoskeletal:  Normal gait.             Psychiatric:  Cooperative, Appropriate mood & affect, Normal judgment.  Assessment/Plan       1. Anticoagulated (Z79.01)         continue with warfarin and regular monitoring       2. Ovarian cancer (C56.9)         in remission, follow up with oncology       3. Atrial fibrillation (I48.0)         resolved after ablation, continue with warfarin  Patient Information     Name:MARNI SANTANA      Address:      [address] [place] [id number]     Sex:Female     YOB: 1950     [phone]     Emergency Contact:ABBIE MENDEZ     MRN:[id number]     FIN:[id number]     Location:[facility]     Date of Service:04/27/2021      Primary Care Physician:       Kirk Agarwal MD, [phone]      Attending Physician:       Kirk Agarwal MD, [phone]  Problem List/Past Medical History    Ongoing     Anticoagulated     Atrial fibrillation     DVT - Deep vein thrombosis     Hypertension     Osteoporosis     Ovarian cancer     PE - Pulmonary embolism     Warfarin anticoagulation    Historical     Inpatient stay        Comments: [facility] - Blood clots in lung, legs, and pelvic mass.     Inpatient stay       Comments: [facility] - Right pleural effusion.     Inpatient stay       Comments: [facility] - Afib ablation.  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     PLND - Pelvic lymph node dissection (01/27/2020)            Comments: Bilateral dissection.   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     Thoracentesis - Right pleural effusion (12/26/2019)           Screening for colon cancer (05/23/2019)            Comments: FIT card:  negative      Recommendation:  f/u 1yr.     Breast implant        Medications    alendronate 35 mg oral tablet, 35 mg= 1 tab(s), Oral, qweek, 3 refills    Daily Multiple Vitamins, Oral, daily    furosemide 40 mg oral tablet, 40 mg= 1 tab(s), Oral, daily, 1 refills    metoprolol succinate 50 mg oral capsule, extended release, 25 mg= 0.5 cap(s), Oral, daily    warfarin 5 mg oral tablet, 5 mg= 1 tab(s), Oral, daily  Allergies    penicillin (bumps on skin)  Social History    Smoking Status     Never smoker     Alcohol      Never     Electronic Cigarette/Vaping      Electronic Cigarette Use: Never.     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     Exercise      Exercise frequency: unknown frequency.     Home/Environment      Marital  status: . Lives with Self.     Nutrition/Health      Type of diet: Regular.     Sexual      Sexually active: No.     Substance Abuse      Never     Tobacco      Never (less than 100 in lifetime)  Family History    Asthma: Sister.    Atrial fibrillation: Brother.    HTN - Hypertension: Father.  Lab Results       Lab Results (Last 4 results within 90 days)        Sodium Level: 141 mmol/L [135 mmol/L - 146 mmol/L] (02/26/21 08:21:00)       Potassium Level: 4.2 mmol/L [3.5 mmol/L - 5.3 mmol/L] (02/26/21 08:21:00)       Chloride Level: 104 mmol/L [98 mmol/L - 110 mmol/L] (02/26/21 08:21:00)       CO2 Level: 29 mmol/L [20 mmol/L - 32 mmol/L] (02/26/21 08:21:00)       Glucose Level: 92 mg/dL [65 mg/dL - 99 mg/dL] (02/26/21 08:21:00)       BUN: 18 mg/dL [7 mg/dL - 25 mg/dL] (02/26/21 08:21:00)       Creatinine Level: 0.99 mg/dL High [0.6 mg/dL - 0.93 mg/dL] (02/26/21 08:21:00)       BUN/Creat Ratio: 18 [6  - 22] (02/26/21 08:21:00)       eGFR: 57 mL/min/1.73m2 Low (02/26/21 08:21:00)       eGFR : 66 mL/min/1.73m2 (02/26/21 08:21:00)       Calcium Level: 9.5 mg/dL [8.6 mg/dL - 10.4 mg/dL] (02/26/21 08:21:00)       Cholesterol: 175 mg/dL (02/26/21 08:21:00)       Non-HDL Cholesterol: 100 (02/26/21 08:21:00)       HDL: 75 mg/dL (02/26/21 08:21:00)       Cholesterol/HDL Ratio: 2.3 (02/26/21 08:21:00)       LDL: 80 (02/26/21 08:21:00)       Triglyceride: 102 mg/dL (02/26/21 08:21:00)       WBC: 6.7 [3.8  - 10.8] (02/26/21 08:21:00)       RBC: 3.73 Low [3.8  - 5.1] (02/26/21 08:21:00)       Hgb: 11.2 gm/dL Low [11.7 gm/dL - 15.5 gm/dL] (02/26/21 08:21:00)       Hct: 33.5 % Low [35 % - 45 %] (02/26/21 08:21:00)       MCV: 89.8 fL [80 fL - 100 fL] (02/26/21 08:21:00)       MCH: 30 pg [27 pg - 33 pg] (02/26/21 08:21:00)       MCHC: 33.4 gm/dL [32 gm/dL - 36 gm/dL] (02/26/21 08:21:00)       RDW: 12.4 % [11 % - 15 %] (02/26/21 08:21:00)       Platelet: 234 [140  - 400] (02/26/21 08:21:00)       MPV: 9.6 fL [7.5  fL - 12.5 fL] (02/26/21 08:21:00)       INR POC: 1.9 (04/21/21 09:32:00)       INR POC: 1.8 (03/29/21 09:50:00)       Reference Range - INR POC: 2.0-3.0 (04/21/21 09:32:00)       Reference Range - INR POC: 2.0-3.0 (03/29/21 09:50:00)       Protime: 23.9 High [12  - 14.6] (02/26/21 08:30:00)       INR: 1.9 High (03/11/21 08:20:00)       INR: 2.1 High (02/26/21 08:30:00)       INR TR: 2.5 (02/26/21 10:09:00)       Prothrombin Time: 23.3 High [10.5  - 13.1] (03/11/21 08:20:00)

## 2022-02-15 NOTE — NURSING NOTE
Anticoagulation Therapy Entered On:  1/29/2019 2:29 PM CST    Performed On:  1/29/2019 2:28 PM CST by Cristel Hernandez RN               Warfarin Management   Week 1 Tuesday Dose :   2.5    Week 1 Wednesday Dose :   2.5    Week 1 Thursday Dose :   2.5    Planned Duration :   Indefinite   Indication :   Atrial fibrillation   INR Range :   2.0 - 3.0   INR Therapeutic Range :   No   Warfarin Abnormal Findings :   none   Cristel Hernandez RN - 1/29/2019 2:28 PM CST   Warfarin Management and Results Grid   Signs of Thrombolic :   No   Signs of Warfarin Intolerance :   No   Changes in Diet or Alcohol Intake :   No   Changes in Medication or Antibiotics :   No   Unusual Bleeding or Bruising :   No   Rectal Bleeding or Black Stools :   No   Cristel Hernandez RN - 1/29/2019 2:28 PM CST   Anticoagulation Recheck :   2/1/19   Warfarin Physician :   Kirk Agarwal MD Special Instructions :   INR = 3.2 per Lab Patient is to take 2.5mg warfarin daily and recheck INR on 2/1/19 Patient advised in office.   Cristel Hernandez RN - 1/29/2019 2:28 PM CST

## 2022-02-15 NOTE — RESULTS
Anticoagulation Therapy Entered On:  2/22/2019 8:04 AM CST    Performed On:  2/21/2019 4:09 PM CST by Alexa Phillips RN               Warfarin Management   Week 1 Sunday Dose :   2.5    Week 1 Monday Dose :   2.5    Week 1 Tuesday Dose :   2.5    Week 1 Wednesday Dose :   2.5    Week 1 Thursday Dose :   2.5    Week 1 Friday Dose :   2.5    Week 1 Saturday Dose :   2.5    Week 1 Dose Total :   17.5    Week 2 Sunday Dose :   2.5    Week 2 Monday Dose :   2.5    Week 2 Tuesday Dose :   2.5    Week 2 Wednesday Dose :   2.5    Week 2 Thursday Dose :   2.5    Week 2 Friday Dose :   2.5    Week 2 Saturday Dose :   2.5    Week 2 Dose Total :   17.5    Planned Duration :   Indefinite   Indication :   Atrial fibrillation   Warfarin Management Comments :   Lab test performed by:  [place] [address]  Phone # [phone]  Fax# [phone]  Capillary on 2/21/19   International Normalization Ratio :   2.0    INR Range :   2.0 - 3.0   INR Therapeutic Range :   Yes   Alexa Phillips RN - 2/22/2019 8:02 AM CST   Warfarin Management and Results Grid   Signs of Thrombolic :   No   Signs of Warfarin Intolerance :   No   Changes in Diet or Alcohol Intake :   No   Changes in Medication or Antibiotics :   No   Unusual Bleeding or Bruising :   No   Rectal Bleeding or Black Stools :   No   Vitamin K Food Handout :   No   Heart Valve Replacement :   No   Alexa Phillips RN - 2/22/2019 8:02 AM CST   Anticoagulation Recheck :   4 weeks   Warfarin Special Instructions :   Per GTG continue warfarin 2.5 mg daily; recheck 4 weeks; pt notified by phone on 2/22/19   Alexa Phillips RN - 2/22/2019 8:02 AM CST

## 2022-02-15 NOTE — NURSING NOTE
Comprehensive Intake Entered On:  4/27/2021 10:03 AM CDT    Performed On:  4/27/2021 9:57 AM CDT by Nan Delgadillo MA               Summary   Chief Complaint :   med check   Weight Measured :   135.9 lb(Converted to: 135 lb 14 oz, 61.643 kg)    Height Measured :   62 in(Converted to: 5 ft 2 in, 157.48 cm)    Body Mass Index :   24.85 kg/m2   Body Surface Area :   1.64 m2   Systolic Blood Pressure :   126 mmHg   Diastolic Blood Pressure :   82 mmHg (HI)    Mean Arterial Pressure :   97 mmHg   Peripheral Pulse Rate :   74 bpm   BP Site :   Right arm   Pulse Site :   Radial artery   BP Method :   Electronic   HR Method :   Electronic   Temperature Tympanic :   97.5 DegF(Converted to: 36.4 DegC)  (LOW)    Oxygen Saturation :   99 %   Nan Delgadillo MA - 4/27/2021 9:57 AM CDT   Health Status   Allergies Verified? :   Yes   Medication History Verified? :   Yes   Medical History Verified? :   Yes   Pre-Visit Planning Status :   Completed   Tobacco Use? :   Never smoker   Nan Delgadillo MA - 4/27/2021 9:57 AM CDT   Consents   Consent for Immunization Exchange :   Consent Granted   Consent for Immunizations to Providers :   Consent Granted   Nan Delgadillo MA - 4/27/2021 9:57 AM CDT   Meds / Allergies   (As Of: 4/27/2021 10:03:59 AM CDT)   Allergies (Active)   penicillin  Estimated Onset Date:   Unspecified ; Reactions:   bumps on skin ; Created By:   Zahraa Castillo; Reaction Status:   Active ; Category:   Drug ; Substance:   penicillin ; Type:   Allergy ; Updated By:   Zahraa Castillo; Source:   Patient ; Reviewed Date:   2/7/2020 2:11 PM CST        Medication List   (As Of: 4/27/2021 10:03:59 AM CDT)   Prescription/Discharge Order    alendronate  :   alendronate ; Status:   Prescribed ; Ordered As Mnemonic:   alendronate 35 mg oral tablet ; Simple Display Line:   35 mg, 1 tab(s), Oral, qweek, 12 tab(s), 3 Refill(s) ; Ordering Provider:   Kirk Agarwal MD; Catalog Code:   alendronate ; Order Dt/Tm:   8/4/2020  2:58:12 PM CDT          furosemide  :   furosemide ; Status:   Prescribed ; Ordered As Mnemonic:   furosemide 40 mg oral tablet ; Simple Display Line:   40 mg, 1 tab(s), Oral, daily, for 90 day(s), 90 tab(s), 1 Refill(s) ; Ordering Provider:   Kirk Agarwal MD; Catalog Code:   furosemide ; Order Dt/Tm:   5/4/2020 3:53:00 PM CDT          warfarin  :   warfarin ; Status:   Prescribed ; Ordered As Mnemonic:   warfarin 5 mg oral tablet ; Simple Display Line:   5 mg, 1 tab(s), Oral, daily, 30 tab(s), 0 Refill(s) ; Ordering Provider:   Kirk Agarwal MD; Catalog Code:   warfarin ; Order Dt/Tm:   10/30/2020 9:47:39 AM CDT            Home Meds    metoprolol  :   metoprolol ; Status:   Documented ; Ordered As Mnemonic:   metoprolol succinate 50 mg oral capsule, extended release ; Simple Display Line:   25 mg, 0.5 cap(s), Oral, daily, 0 Refill(s) ; Catalog Code:   metoprolol ; Order Dt/Tm:   1/28/2019 2:09:51 PM CST          multivitamin  :   multivitamin ; Status:   Documented ; Ordered As Mnemonic:   Daily Multiple Vitamins ; Simple Display Line:   Oral, daily, 0 Refill(s) ; Catalog Code:   multivitamin ; Order Dt/Tm:   2/10/2020 9:18:09 AM CST            ID Risk Screen   Recent Travel History :   No recent travel   Family Member Travel History :   No recent travel   Other Exposure to Infectious Disease :   Unknown   COVID-19 Testing Status :   No positive COVID-19 test   Nan Delgadillo MA - 4/27/2021 9:57 AM CDT   Social History   Social History   (As Of: 4/27/2021 10:03:59 AM CDT)   Alcohol:        Never   (Last Updated: 8/4/2020 3:26:13 PM CDT by Nan Delgadillo MA)          Tobacco:        Never (less than 100 in lifetime)   (Last Updated: 4/27/2021 9:57:50 AM CDT by Nan Delgadillo MA)          Electronic Cigarette/Vaping:        Electronic Cigarette Use: Never.   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   (Last Updated: 1/28/2019 3:47:30 PM CST by Nan Delgadillo MA)          Home/Environment:        Marital status: .  Lives with Self.   (Last Updated: 1/28/2019 3:47:41 PM CST by Nan Delgadillo MA)          Nutrition/Health:        Type of diet: Regular.   (Last Updated: 8/4/2020 3:26:27 PM CDT by Nan Delgadillo MA)          Exercise:        Exercise frequency: unknown frequency.   (Last Updated: 1/28/2019 3:48:10 PM CST by Nan Delgadillo MA)          Sexual:        Sexually active: No.   (Last Updated: 8/4/2020 3:26:37 PM CDT by Nan Delgadillo MA)

## 2022-02-15 NOTE — RESULTS
Anticoagulation Therapy Entered On:  5/21/2019 2:47 PM CDT    Performed On:  5/21/2019 2:38 PM CDT by Cristel Hernandez RN               Warfarin Management   Week 1 Sunday Dose :   2.5    Week 1 Monday Dose :   2.5    Week 1 Tuesday Dose :   2.5    Week 1 Wednesday Dose :   2.5    Week 1 Thursday Dose :   5    Week 1 Friday Dose :   2.5    Week 1 Saturday Dose :   5    Week 1 Dose Total :   22.5    Week 2 Sunday Dose :   2.5    Week 2 Monday Dose :   2.5    Week 2 Tuesday Dose :   2.5    Week 2 Wednesday Dose :   2.5    Week 2 Thursday Dose :   5    Week 2 Friday Dose :   2.5    Week 2 Saturday Dose :   5    Week 2 Dose Total :   22.5    Planned Duration :   Indefinite   Indication :   Atrial fibrillation   Warfarin Management Comments :   [facility], [facility]  [address], [address]  [phone]  Capillary Specimen     International Normalization Ratio :   1.1    INR Range :   2.0 - 3.0   INR Therapeutic Range :   No   Warfarin Abnormal Findings :   got confused on dosing and went back to 2.5mg warfarin daily after 1 week. Missed several doses as well.   Anticoagulation Recheck :   2 weeks   Warfarin Physician :   Kirk Agarwal MD   Warfarin Special Instructions :   INR = 1.1 per POC. Patient is to take 5mg warfarin today then 5mg on Thurs and Sat and 2.5mg the rest of the days of the week. Recheck INR in 2 weeks per GTG Patient given verbal and written directions in office.   Cristel Hernandez RN - 5/21/2019 2:38 PM CDT

## 2022-02-15 NOTE — NURSING NOTE
Anticoagulation Therapy Management Entered On:  3/29/2021 10:28 AM CDT    Performed On:  3/29/2021 10:25 AM CDT by Maddy Stevens RN               Anticoagulation Visit Assessment   Type of Visit - Anticoagulation :   Telephone   Anticoagulation Indication :   Atrial fibrillation   Anticoagulant Duration :   Undetermined   Anticoagulation Medication Verified :   Yes   Maddy Stevens RN - 3/29/2021 10:25 AM CDT   Anticoagulation Patient Assessment Grid   Change in Alcohol Consumption :   No   Change in Diet :   No   Change in Medications :   No   Diarrhea :   No   Rectal Bleeding :   No   Signs of Clotting :   No   Signs of Warfarin Intolerance :   No   Unusual Bleeding, Bruising :   No   Upcoming Procedures :   No   Vomiting :   No   Maddy Stevens RN - 3/29/2021 10:25 AM CDT   Patient on Warfarin :   Yes   Maddy Stevens RN - 3/29/2021 10:25 AM CDT   Warfarin   Anticoagulant INR Goal Lower :   2    Anticoagulant INR Goal Upper :   3    Sunday :   2.5 mg   Monday :   5 mg   Tuesday :   2.5 mg   Wednesday :   2.5 mg   Thursday :   5 mg   Friday :   2.5 mg   Saturday :   2.5 mg   Total Dose :   22.5 mg   Warfarin Pt Reported Previous Week Dose :    Sun Mon Tues Wed Thurs Fri Sat Weekly Total Dose   Week 1 2.5 mg 5 mg 2.5 mg 2.5 mg 5 mg 2.5 mg 2.5 mg 22.5 mg   Week 2  mg  mg  mg  mg  mg  mg  mg  mg   Week 3  mg  mg  mg  mg  mg  mg  mg  mg   Week 4  mg  mg  mg  mg  mg  mg  mg  mg         Patient is taking single or multiple strength tablet(s) :   Single strength tab(s)   One Tab Strength :   5 mg tab   Sunday :   2.5 mg   Monday :   5 mg   Tuesday :   2.5 mg   Wednesday :   5 mg   Thursday :   2.5 mg   Friday :   5 mg   Saturday :   2.5 mg   Week 1 Total Dose :   25 mg   Sunday :   0.5 tab(s)   Monday :   1 tab(s)   Tuesday :   0.5 tab(s)   Wednesday :   1 tab(s)   Thursday :   0.5 tab(s)   Friday :   1 tab(s)   Saturday :   0.5 tab(s)   Warfarin Dosing Acknowledgement :   I have reviewed the patient's warfarin dosing schedule and  confirmed its accuracy for today's visit.   Patient Instructions :   POC INR = 1.8. Per protocol, increase Warfarin to 5mg Mon/Wed/Fri and 2.5mg ROW. Recheck INR in 2 weeks. Advised by phone.      Rodney OLVERA, Maddy - 3/29/2021 10:25 AM CDT

## 2022-02-15 NOTE — NURSING NOTE
Quick Intake Entered On:  9/6/2019 9:19 AM CDT    Performed On:  9/6/2019 9:19 AM CDT by Cristel Hernandez RN               Summary   Systolic Blood Pressure :   116 mmHg   Diastolic Blood Pressure :   74 mmHg   Mean Arterial Pressure :   88 mmHg   Cristel Hernandez RN - 9/6/2019 9:19 AM CDT

## 2022-02-15 NOTE — PROGRESS NOTES
Chief Complaint    verbal consent given for telemed visit.  discuss getting Lasix refilled, was previously getting from cardio but now wants primary to fill rx.   Today's visit was conducted via telephone due to the COVID-19 pandemic.  Patient's consent to telephone visit was obtained and documented.   Call Start Time: 1548   Call End Time:   1555  History of Present Illness      Urine is requesting a refill of furosemide.  She has been on 40 mg daily to help with lower extremity edema and a recurrent pleural effusion.  Her cardiologist recently reduced metoprolol 25 mg daily which has helped her blood pressure.  She has finished her chemotherapy for ovarian cancer.  She continues on enoxaparin while on chemotherapy.  There are plans to return to warfarin.  Review of Systems      See HPI.  All other review of systems negative.  Assessment/Plan       1. Hypertension (I10)         Continue with her current medications.  I refilled furosemide.  She will follow-up with oncology as scheduled.       Orders:         furosemide, = 1 tab(s) ( 40 mg ), Oral, daily, # 90 tab(s), 1 Refill(s), Type: Maintenance, Pharmacy: [id number], 1 tab(s) Oral daily,x90 day(s), (Ordered)  Patient Information     Name:MARNI SANTANA      Address:      [address] [place] [id number]     Sex:Female     YOB: 1950     [phone]     Emergency Contact:ABBIE MENDEZ     MRN:[id number]     FIN:[id number]     Location:[facility]     Date of Service:05/04/2020      Primary Care Physician:       Kirk Agarwal MD, [phone]      Attending Physician:       Kirk Agarwal MD, [phone]  Problem List/Past Medical History    Ongoing     Atrial fibrillation     DVT - Deep vein thrombosis     Hypertension     Ovarian cancer     PE - Pulmonary embolism     Warfarin anticoagulation    Historical     Inpatient stay       Comments: , [place] - Blood clots in lung, legs,  and pelvic mass.     Inpatient stay       Comments: [facility], [place] - Right pleural effusion.  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     PLND - Pelvic lymph node dissection (01/27/2020)      Comments: Bilateral dissection.   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 Lives with Self., 01/28/2019     Substance Abuse      Never, 01/28/2019     Tobacco      Never (less than 100 in lifetime), 01/28/2019  Family History    Atrial fibrillation: Brother.    HTN - Hypertension: Father.  Lab Results          Lab Results (Last 4 results within 90 days)           Sodium Level: 143 [135 mmol/L - 145 mmol/L] (02/07/20 14:36:00)          Potassium Level: 4.3 [3.5 mmol/L - 5 mmol/L] (02/07/20  14:36:00)          Chloride Level: 107 [98 mmol/L - 110 mmol/L] (02/07/20 14:36:00)          CO2 Level: 28 [21 mmol/L - 31 mmol/L] (02/07/20 14:36:00)          AGAP: 8 [5  - 18] (02/07/20 14:36:00)          Glucose Level: 99 [65 mg/dL - 100 mg/dL] (02/07/20 14:36:00)          BUN: 17 [8 mg/dL - 25 mg/dL] (02/07/20 14:36:00)          Creatinine Level: 1.29 High [0.57 mg/dL - 1.11 mg/dL] (02/07/20 14:36:00)          BUN/Creat Ratio: 13 [10  - 20] (02/07/20 14:36:00)          eGFR : 50 Low (02/07/20 14:36:00)          eGFR Non-: 41 Low (02/07/20 14:36:00)          Calcium Level: 9.1 [8.5 mg/dL - 10.5 mg/dL] (02/07/20 14:36:00)          WBC: 9.3 [4.5  - 11] (02/07/20 14:36:00)          RBC: 4.02 [4  - 5.2] (02/07/20 14:36:00)          Hgb: 12.0 [12 g/dL - 16 g/dL] (02/07/20 14:36:00)          Hct: 37.9 [33 % - 51 %] (02/07/20 14:36:00)          MCV: 94 [80 fL - 100 fL] (02/07/20 14:36:00)          MCH: 29.9 [26 pg - 34 pg] (02/07/20 14:36:00)          MCHC: 31.7 Low [32 g/dL - 36 g/dL] (02/07/20 14:36:00)          RDW: 15.2 [11.5 % - 15.5 %] (02/07/20 14:36:00)          Platelet: 533 High [140  - 440] (02/07/20 14:36:00)          MPV: 8.6 [6.5 fL - 11 fL] (02/07/20 14:36:00)

## 2022-02-15 NOTE — LETTER
(Inserted Image. Unable to display)   August 05, 2021  MARNI SANTANA  [address] [place] [address]        Dear MARNI,    Thank you for selecting [facility] for your healthcare needs.    Our records indicate you are due for the following services:     Annual Physical     (FYI   Regarding office visits: In some instances, a video visit or telephone visit may be offered as an option.)    To schedule an appointment or if you have further questions, please contact your clinic at [phone].    Powered by [employer]    Sincerely,    Kirk Agarwal MD

## 2022-02-15 NOTE — RESULTS
Anticoagulation Therapy Entered On:  4/4/2019 2:53 PM CDT    Performed On:  4/4/2019 2:52 PM CDT by Maddy Stevens RN               Warfarin Management   Week 1 Sunday Dose :   2.5    Week 1 Monday Dose :   2.5    Week 1 Tuesday Dose :   2.5    Week 1 Wednesday Dose :   2.5    Week 1 Thursday Dose :   5    Week 1 Friday Dose :   2.5    Week 1 Saturday Dose :   5    Week 1 Dose Total :   22.5    Week 2 Sunday Dose :   2.5    Week 2 Monday Dose :   2.5    Week 2 Tuesday Dose :   2.5    Week 2 Wednesday Dose :   2.5    Week 2 Thursday Dose :   5    Week 2 Friday Dose :   2.5    Week 2 Saturday Dose :   5    Week 2 Dose Total :   22.5    Planned Duration :   Indefinite   Indication :   Atrial fibrillation   Warfarin Management Comments :   Lab test performed by:  Counts include 234 beds at the [facility] Office  [address] [place] [address]  Phone # [phone]  Fax# [phone]  Capillary   International Normalization Ratio :   1.4    INR Range :   2.0 - 3.0   INR Therapeutic Range :   No   Warfarin Abnormal Findings :   None   Anticoagulation Recheck :   2 weeks   Warfarin Special Instructions :   INR = 1.4 per POC. Patient to take Warfarin 5mg on Thur, Sat and 2.5mg ROW. Recheck INR in 2 weeks per GTG. Patient advised via phone.    Maddy Stevens RN - 4/4/2019 2:52 PM CDT

## 2022-02-15 NOTE — NURSING NOTE
Quick Intake Entered On:  5/21/2019 2:23 PM CDT    Performed On:  5/21/2019 2:23 PM CDT by Cristel Hernandez RN               Summary   Systolic Blood Pressure :   130 mmHg   Diastolic Blood Pressure :   74 mmHg   Mean Arterial Pressure :   93 mmHg   Cristel Hernandez RN - 5/21/2019 2:23 PM CDT

## 2022-02-15 NOTE — PROGRESS NOTES
Patient:   MARNI SANTANA            MRN: [id number]            FIN: [id number]               Age:   69 years     Sex:  Female     :  1950   Associated Diagnoses:   S/P total hysterectomy and bilateral salpingo-oophorectomy; SOB (shortness of breath); Recurrent pleural effusion on right; Hx of pleural effusion   Author:   Saud Kim PA-C      Chief Complaint   2020 1:59 PM CST     Pt here for a Post Op visit for Total hysterectomy and bilateral salpingo oophorectomy done .  Pt states she is healing fine but is having increased SOB x 2 days.      History of Present Illness   Chief complaint and symptoms noted above and confirmed with patient   had total hysterectomy and bilateral salpinoophorectomy done on  at [facility] (-), diagnosed with ovarian cancer  is on lovenox    Prior to this most recent hospitalization she was admitted to [facility] - with a pleural effusion  then in mid  had bilateral PE with  recurrent right sided pleural effusion, had subsequent thoracentesis and was at [facility] 1/10-      she is healing well from surgery but having increased SOB for 2 days      in the upcoming week she will be following up with her surgeon and cardiologist         Review of Systems   Constitutional:  Negative.    Ear/Nose/Mouth/Throat:  No nasal congestion.    Respiratory:  Shortness of breath, Cough.    Cardiovascular:  Negative.    Gastrointestinal:  Negative.       Health Status   Allergies:    Allergic Reactions (Selected)  Severity Not Documented  Penicillin (Bumps on skin)   Medications:  (Selected)   Documented Medications  Documented  Tylenol Extra Strength: Oral, q6 hrs, 0 Refill(s), Type: Maintenance  enoxaparin 60 mg/0.6 mL injectable solution: Subcutaneous, q12 hrs, 0 Refill(s), Type: Maintenance  furosemide 40 mg oral tablet: = 1 tab(s) ( 40 mg ), Oral, daily, 0 Refill(s), Type: Maintenance  metoprolol succinate 50 mg oral capsule, extended release: = 1 cap(s) ( 50 mg ),  Oral, daily, 0 Refill(s), Type: Maintenance  warfarin 5 mg oral tablet: = 0.5 tab(s) ( 2.5 mg ), Oral, daily, 0 Refill(s), Type: Maintenance   Problem list:    All Problems (Selected)  Hypertension / SNOMED CT 9921524518 / Confirmed  Warfarin anticoagulation / SNOMED CT 68583600 / Confirmed  Atrial fibrillation / SNOMED CT 09043821 / Confirmed      Histories   Past Medical History:    Resolved  Inpatient stay ([id number]): Onset on 1/10/2020 at 69 years.  Resolved on 1/14/2020 at 69 years.  Comments:  1/31/2020 CST 10:58 AM CST - [facility], [place] - Blood clots in lung, legs, and pelvic mass.   Family History:    Atrial fibrillation  Brother  HTN - Hypertension  Father     Procedure history:    Exploratory laparotomy ([id number]) on 1/27/2020 at 69 Years.  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  Comments:  1/29/2020 10:09 AM Nan Beckham MA  Bilateral dissection.   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  Breast implant ([id number]).   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      Home and Environment Assessment            Marital status: .  Lives with Self.      Exercise and Physical Activity Assessment            Exercise frequency: unknown frequency.        Physical Examination   Vital Signs   2/7/2020 1:59 PM CST Temperature Tympanic 99.1 DegF    Peripheral Pulse Rate 80 bpm    Pulse Site Radial artery    Respiratory Rate 26 br/min  HI    Systolic Blood Pressure 98 mmHg    Diastolic Blood Pressure 80 mmHg    Mean Arterial Pressure 86 mmHg    BP Site Right arm    Oxygen Saturation 95 %      Measurements from flowsheet : Measurements   2/7/2020 1:59 PM CST Height Measured - Standard 62 in    Weight Measured - Standard 122 lb    BSA 1.55 m2    Body Mass Index 22.31 kg/m2      General:  No acute distress.    HENT:  Tympanic membranes are clear, No pharyngeal erythema, No sinus tenderness.    Neck:  Supple, Non-tender, No lymphadenopathy.    Respiratory:  Respirations are non-labored, diminished lung sounds in right lower lobe, slight rhonchi, no rales.    Cardiovascular:  Normal rate, Regular rhythm, No murmur.       Review / Management   Results review:  Lab results   2/7/2020 2:36 PM CST Sodium Level 143 mmol/L    Potassium Level 4.3 mmol/L    Chloride Level 107 mmol/L    CO2 Level 28 mmol/L    AGAP 8    Glucose Level 99 mg/dL    BUN 17 mg/dL    Creatinine Level 1.29 mg/dL    BUN/Creat Ratio 13    eGFR  50    eGFR Non-African American 41    Calcium Level 9.1 mg/dL    WBC 9.3    RBC 4.02    Hgb 12.0 g/dL    Hct 37.9 %    MCV 94 fL    MCH 29.9 pg    MCHC 31.7 g/dL    RDW 15.2 %    Platelet 533    MPV 8.6 fL   .         Interpretation: Labs unremarkable.    Radiology results   Results reviewed with patient.  Chest xray shows large right effusion per read by radiologist.     Course:  pulse ox at rest is 95% but with  talking or walking drops below 90%.       Impression and Plan   Diagnosis     S/P total hysterectomy and bilateral salpingo-oophorectomy (YBA11-QD Z90.710).     SOB (shortness of breath) (CNO77-GL R06.02).     Recurrent pleural effusion on right (ULS23-OC J90).     Plan:  called [facility], discussed case with Dr Diane,  will arrange for observation bed and probable radiology intervention to drain pleural effusion   patient will travel by car to [place] to Room Obs 43.    Orders     Orders   Requests (Lab):  CBC w/o Diff (Request) (Order): Priority: Urgent, SOB (shortness of breath)  Hx of pleural effusion  S/P total hysterectomy and bilateral salpingo-oophorectomy  Basic Metabolic Panel (Request) (Order): Priority: Urgent, SOB (shortness of breath)  Hx of pleural effusion  S/P total hysterectomy and bilateral salpingo-oophorectomy.     Orders   Requests (Radiology):  XR Chest PA/Lat (Request) (Order): Instructions: post-op SOB, recent pleural effusion, bilateral PE, SOB (shortness of breath)  Hx of pleural effusion.     Orders   Charges (Evaluation and Management):  [id number] office outpatient visit 25 minutes (Charge) (Order): Quantity: 1, SOB (shortness of breath)  Recurrent pleural effusion on right  S/P total hysterectomy and bilateral salpingo-oophorectomy.

## 2022-02-15 NOTE — NURSING NOTE
Quick Intake Entered On:  3/14/2019 2:28 PM CDT    Performed On:  3/14/2019 2:28 PM CDT by Nan Delgadillo MA               More Vitals   Systolic Blood Pressure Repeat :   136 mmHg   Diastolic Blood Pressure Repeat :   76 mmHg   BP Site Repeat :   Right arm   Nan Delgadillo MA - 3/14/2019 2:28 PM CDT

## 2022-02-15 NOTE — RESULTS
(Inserted Image. Unable to display)          (Inserted Image. Unable to display)     [address], [place] [address]  Phone [phone]  Fax [phone]       AMBULATORY CARDIAC TELEMETRY (ACT) REPORT    ESTHER MARNI J.     : 1950   Recording Period: 2019 MRN: [id number]  Ordering HCP:  Kirk Agarwal MD       INDICATION:  Atrial fibrillation.     FINDINGS:  Patient was monitored for 2 days.  The rhythm is sinus.  No pauses or atrial fibrillation.  There is a single manual event; the patient did not record her symptom, however, the recording is sinus rhythm.      IMPRESSION:  Negative study.                                                                             Ugo Campbell MD, FACP  Interpreting HCP                     CELSA/marvin  D:  2019                                                                          T:  2019    AMBULATORY CARDIAC TELEMETRY (ACT) REPORT

## 2022-02-15 NOTE — TELEPHONE ENCOUNTER
---------------------  From: Alexa Phillips RN   To: INR Pool ( [id number]_[facility]);     Sent: 8/30/2019 9:43:38 AM CDT  Subject: Urgent INR Low     Pt had urgent venous INR done with other labs this morning.    Result was 1.1, indicating she has not been taking warfarin.    I LVM on her listed number asking for a call back ASAP to discuss.Patient developed an itchy rash on her chest last week and decided that the warfarin must be causing it. She stopped taking the warfarin around 5 days ago and has been taking  mg daily x 3 days. The rash has cleared.    Patient is wondering if she should re-start warfarin or start something else.    She said there was no rash or itching on any other part of her body and she has never had a rash like this before; or any other problems with warfarin.    GTG is OC today so I will discuss with another provider. Pt is ok to wait for call back with directions.Discussed situation with MJP and notified pt of directions. See anticoagulation charting.

## 2022-02-15 NOTE — NURSING NOTE
PT/INR POC Entered On:  7/7/2021 11:48 AM CDT    Performed On:  7/7/2021 11:47 AM CDT by Yanet March               PT/INR POC   INR POC :   1.7    Reference Range - INR POC :   2.0-3.0   Yanet March - 7/7/2021 11:47 AM CDT   Details   Collection Date :   7/7/2021 11:47 AM CDT   Expiration Date :   4/30/2022 CDT   Lot#/Manufacture :   [id number]   Handling Specimen POC :   capillary    :   Yanet Bray - 7/7/2021 11:47 AM CDT

## 2022-02-15 NOTE — NURSING NOTE
Anticoagulation Therapy Management Entered On:  3/11/2021 10:31 AM CST    Performed On:  3/11/2021 10:27 AM CST by Cristel Hernandez RN               Anticoagulation Visit Assessment   Type of Visit - Anticoagulation :   Face to face   Anticoagulation Indication :   Atrial fibrillation   Anticoagulant Duration :   Undetermined   Anticoagulation Medication Verified :   Yes   Cristel Hernandez RN - 3/11/2021 10:27 AM CST   Anticoagulation Patient Assessment Grid   Change in Alcohol Consumption :   No   Change in Diet :   No   Change in Medications :   No   Diarrhea :   No   Rectal Bleeding :   No   Signs of Clotting :   No   Signs of Warfarin Intolerance :   No   Unusual Bleeding, Bruising :   No   Upcoming Procedures :   No   Vomiting :   No   Cristel Hernandez RN - 3/11/2021 10:27 AM CST   Patient on Warfarin :   Yes   Patient on Other Anticoagulant :   No   Cristel Hernandez RN - 3/11/2021 10:27 AM CST   Warfarin   Anticoagulant INR Goal Lower :   2    Anticoagulant INR Goal Upper :   3    Information Given by :   Patient   Sunday :   2.5 mg   Monday :   5 mg   Tuesday :   2.5 mg   Wednesday :   2.5 mg   Thursday :   5 mg   Friday :   2.5 mg   Saturday :   2.5 mg   Total Dose :   22.5 mg   Warfarin Pt Reported Previous Week Dose :    Sun Mon Tues Wed Thurs Fri Sat Weekly Total Dose   Week 1 2.5 mg 5 mg 2.5 mg 2.5 mg 5 mg 2.5 mg 2.5 mg 22.5 mg   Week 2  mg  mg  mg  mg  mg  mg  mg  mg   Week 3  mg  mg  mg  mg  mg  mg  mg  mg   Week 4  mg  mg  mg  mg  mg  mg  mg  mg         Patient is taking single or multiple strength tablet(s) :   Single strength tab(s)   One Tab Strength :   5 mg tab   Sunday :   2.5 mg   Monday :   5 mg   Tuesday :   2.5 mg   Wednesday :   2.5 mg   Thursday :   5 mg   Friday :   2.5 mg   Saturday :   2.5 mg   Week 1 Total Dose :   22.5 mg   Sunday :   0.5 tab(s)   Monday :   1 tab(s)   Tuesday :   0.5 tab(s)   Wednesday :   0.5 tab(s)   Thursday :   1 tab(s)   Friday :   0.5 tab(s)   Saturday :    0.5 tab(s)   Warfarin Dosing Acknowledgement :   I have reviewed the patient's warfarin dosing schedule and confirmed its accuracy for today's visit.   Patient Instructions :   INR = 1.9 per [facility] POC today Patient is to stay on 5mg warfarin on 5mg warfarin on Mon and Thurs and 2.5mg the rest of the days of the week. Recheck INR in 2 weeks per protocol. Patient advised by MAY CABA in clinic.     Cristel Hernandze RN - 3/11/2021 10:27 AM CST

## 2022-02-15 NOTE — PROGRESS NOTES
Chief Complaint    f/u [facility] stay--pleural effusion.  had thorancentesis done.  still has cough.  History of Present Illness      Patient is here for her hospital follow up.  She was at [facility] 12/26/19 to 12/27/19 for pleural effusion, discharge report reviewed.  She had a thoracentesis done, cultures and cytology were done, results came back normal, no malignancy seen, no pathogens detected.  She isn't on O2, her SOB improved.  She still has a cough, feels winded at times.  It was recommended that she have a follow up US done, no recommendations to see a pulmonologist were made.  She was prescribed furosemide 40mg daily for the next 30 days.  She is still taking warfarin 5mg tabs and Metoprolol 50mg daily.  She did have some weight loss when her symptoms started, decreased appetite.  Review of Systems           See HPI.  All other review of systems negative.              Physical Exam   Vitals & Measurements    T: 99.5   F (Tympanic)  HR: 96(Peripheral)  BP: 106/70  SpO2: 96%     HT: 62 in  WT: 115.2 lb  BMI: 21.07           General:  Alert and oriented, No acute distress.            Eye:  Pupils are equal, round and reactive to light, Normal conjunctiva.            HENT:  Oral mucosa is moist.            Neck:  Supple.            Respiratory:  Respirations are non-labored.  Decreased air movement on right.          Cardiovascular:  Normal rate, Regular rhythm, No edema.            Gastrointestinal:  Non-distended.            Musculoskeletal:  Normal gait.            Integumentary:  Warm, No rash.            Psychiatric:  Cooperative, Appropriate mood & affect, Normal judgment.             Assessment/Plan       1. Encounter for examination following treatment at hospital (Z09)         Reviewed and discussed hospital discharge summary with patient.  Medication reconciliation completed.                2. Pleural effusion, right (J90)         Continue with furosemide.  Will order CXR and BMP today.        CXR still shows  fluid in right lung.  CT chest with IV contrast ordered.         Ordered:          CT Abdomen and Pelvis w/contrast (Request), Instructions: IV CONTRAST (no oral), Pleural effusion, right  Weight loss          CT Chest w/ Contrast (Request), Instructions: IV CONTRAST, Pleural effusion, right  Weight loss          XR Chest PA/Lat (Request), Pleural effusion, right                3. Atrial fibrillation (I48.91)         Stable, continue with warfarin.                4. Weight loss (R63.4)         CT abdomen and pelvis with IV contrast ordered to further investigate unknown source of weight loss.         Ordered:          CT Abdomen and Pelvis w/contrast (Request), Instructions: IV CONTRAST (no oral), Pleural effusion, right  Weight loss          CT Chest w/ Contrast (Request), Instructions: IV CONTRAST, Pleural effusion, right  Weight loss                Orders:         Basic Metabolic Panel* ([facility]), Specimen Type: Serum, Collection Date: 01/02/20 16:02:00 CST         CT Abdomen & Pelvis* ([facility]), Priority: Routine, Instructions: Contrast per radiologist         CT Chest* ([facility]), Priority: Routine, Instructions: Contrast per Radiologist      INan MA, acted solely as a scribe for, and in the presence of Dr. Kirk Agarwal who performed the services.             I, Kirk Agarwal MD, personally performed the services described in this documentation.  The documentation was scribed in my presence and is both accurate and complete.                Patient Information     Name:MARNI SANTANA      Address:      [address] [place] [id number]     Sex:Female     YOB: 1950     [phone]     Emergency Contact:ABBIE MENDEZ     MRN:[id number]     FIN:[id number]     Location:[facility]     Date of Service:01/02/2020      Primary Care Physician:       Kirk Agarwal MD, [phone]      Attending Physician:       Kirk Agarwal MD, [phone]  Problem List/Past Medical History    Ongoing     Atrial fibrillation     Hypertension     Warfarin anticoagulation    Historical     No qualifying data  Procedure/Surgical History     Thoracentesis - Right pleural effusion (12/26/2019)           Breast implant        Medications    furosemide 40 mg oral tablet, 40 mg= 1 tab(s), Oral, daily    metoprolol succinate 50 mg oral capsule, extended release, 50 mg= 1 cap(s), Oral, daily    warfarin 5 mg oral tablet, 5 mg= 1 tab(s), Oral, daily  Allergies    penicillin (bumps on skin)  Social History    Smoking Status - 01/02/2020     Never smoker     Employment/School      Employed, 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 Lives with Self., 01/28/2019     Substance Abuse      Never, 01/28/2019     Tobacco      Never (less than 100 in lifetime), 01/28/2019  Family History    Atrial fibrillation: Brother.    HTN - Hypertension: Father.  Lab Results       Lab Results (Last 4 results within 90 days)        Sodium Level: 141 [135 mmol/L - 145 mmol/L] (12/26/19 09:30:00)       Sodium Level TR: 140 mEq/L (12/27/19 06:06:00)       Potassium Level: 4.4 [3.5 mmol/L - 5 mmol/L] (12/26/19 09:30:00)       Potassium Level TR: 4.2 mEq/L (12/27/19 06:06:00)       Chloride Level: 107 [98 mmol/L - 110 mmol/L] (12/26/19 09:30:00)       CO2 Level: 26 [21 mmol/L - 31 mmol/L] (12/26/19 09:30:00)       AGAP: 8 [5  - 18] (12/26/19 09:30:00)       Glucose Level: 90 [65 mg/dL - 100 mg/dL] (12/26/19 09:30:00)       BUN: 12 [8 mg/dL - 25 mg/dL] (12/26/19 09:30:00)       Creatinine Level: 0.76 [0.57 mg/dL - 1.11 mg/dL] (12/26/19 09:30:00)       Creatinine TR: 0.66 mg/dL (12/27/19 06:06:00)       BUN/Creat Ratio: 16 [10  - 20] (12/26/19 09:30:00)       eGFR : >60 (12/26/19 09:30:00)       eGFR Non-: >60 (12/26/19 09:30:00)        Calcium Level: 9.1 [8.5 mg/dL - 10.5 mg/dL] (12/26/19 09:30:00)       Troponin I: <0.010 (12/26/19 09:30:00)       B-Natriuretic Peptide (BNP): 246 High (12/26/19 09:30:00)       WBC: 6.8 [4.5  - 11] (12/26/19 09:30:00)       WBC TR: 6.8 x10^3/uL (12/26/19 09:30:00)       RBC: 4.47 [4  - 5.2] (12/26/19 09:30:00)       Hgb: 13.2 [12 g/dL - 16 g/dL] (12/26/19 09:30:00)       Hgb TR: 13.2 g/dL (12/26/19 09:30:00)       Hct: 40.9 [33 % - 51 %] (12/26/19 09:30:00)       MCV: 92 [80 fL - 100 fL] (12/26/19 09:30:00)       MCH: 29.5 [26 pg - 34 pg] (12/26/19 09:30:00)       MCHC: 32.3 [32 g/dL - 36 g/dL] (12/26/19 09:30:00)       RDW: 13.8 [11.5 % - 15.5 %] (12/26/19 09:30:00)       Platelet: 270 [140  - 440] (12/26/19 09:30:00)       MPV: 9.3 [6.5 fL - 11 fL] (12/26/19 09:30:00)       Lymphocytes: 20.4 (12/26/19 09:30:00)       Abs Lymphocytes: 1.4 [0.9  - 2.9] (12/26/19 09:30:00)       Neutrophils: 68.6 (12/26/19 09:30:00)       Abs Neutrophils: 4.7 [1.7  - 7] (12/26/19 09:30:00)       Monocytes: 7.9 (12/26/19 09:30:00)       Abs Monocytes: 0.5 (12/26/19 09:30:00)       Eosinophils: 2.8 (12/26/19 09:30:00)       Abs Eosinophils: 0.2 (12/26/19 09:30:00)       Basophils: 0.3 (12/26/19 09:30:00)       Abs Basophils: 0.0 (12/26/19 09:30:00)       INR: 3.6 (12/23/19 14:10:00)       INR: 2.5 (11/26/19 14:08:00)       INR: 2 (10/31/19 09:12:00)       INR: 1.8 (10/17/19 08:51:00)       INR TR: 1.5 (12/27/19 06:06:00)

## 2022-02-15 NOTE — LETTER
(Inserted Image. Unable to display)   September 04, 2019        MARNI SANTANA      [address]   [place], [place] [id number]        Dear MARNI,    Thank you for selecting [facility] for your health care needs.  Below you will find the results of your recent test(s) done at our clinic.     The tests were normal.      Result Name Current Result Reference Range   Glucose Level (mg/dL)  79 8/30/2019 65 - 99   Cholesterol (mg/dL)  175 8/30/2019  - <200   Non-HDL Cholesterol  105 8/30/2019  - <130   HDL (mg/dL)  70 8/30/2019 >50 -    Cholesterol/HDL Ratio  2.5 8/30/2019  - <5.0   LDL  90 8/30/2019    Triglyceride (mg/dL)  61 8/30/2019  - <150       Please contact me or my assistant at [phone] if you have any questions about your results.    Sincerely,        Jann Agarwal MD        What do your labs mean?  Below is a glossary of commonly ordered labs:  LDL   Bad Cholesterol   HDL   Good Cholesterol  AST/ALT   Liver Function   Cr/Creatinine   Kidney Function  Microalbumin   Kidney Function  BUN   Kidney Function  PSA   Prostate    TSH   Thyroid Hormone  HgbA1c   Diabetes Test   Hgb (Hemoglobin)   Red Blood Cells

## 2022-02-15 NOTE — NURSING NOTE
Anticoagulation Therapy Entered On:  1/6/2020 3:10 PM CST    Performed On:  1/6/2020 3:08 PM CST by Alexa Phillips RN               Warfarin Management   Week 1 Sunday Dose :   2.5    Week 1 Monday Dose :   2.5    Week 1 Tuesday Dose :   2.5    Week 1 Wednesday Dose :   2.5    Week 1 Thursday Dose :   2.5    Week 1 Friday Dose :   2.5    Week 1 Saturday Dose :   2.5    Week 1 Dose Total :   17.5    Planned Duration :   Indefinite   Indication :   Atrial fibrillation   Warfarin Management Comments :   Lab test performed by:  [facility]  [address] [place] [address]  Phone # [phone]  Fax# [phone]   INR Range :   2.0 - 3.0   INR Therapeutic Range :   No   Warfarin Abnormal Findings :   POC INR > 4.5, patient sent to lab for urgent venous draw.   Alexa Phillips RN - 1/6/2020 3:08 PM CST   Warfarin Management and Results Grid   Signs of Thrombolic :   No   Signs of Warfarin Intolerance :   No   Changes in Diet or Alcohol Intake :   Yes   Changes in Medication or Antibiotics :   No   Unusual Bleeding or Bruising :   No   Rectal Bleeding or Black Stools :   No   Vitamin K Food Handout :   No   Heart Valve Replacement :   No   Alexa Phillips RN - 1/6/2020 3:08 PM CST   Anticoagulation Recheck :   1 week   Warfarin Special Instructions :   Urgent venous INR = 4.7; per GTG decrease warfarin to 2.5 mg daily; recheck INR in 1 week; pt notified in office and given written directions. Discussed s/s of bleeding to watch for and when to go to ER.   Alexa Phillips RN - 1/6/2020 3:08 PM CST

## 2022-02-15 NOTE — NURSING NOTE
Anticoagulation Therapy Management Entered On:  4/21/2021 9:46 AM CDT    Performed On:  4/21/2021 9:45 AM CDT by Alexa Phillips RN               Anticoagulation Visit Assessment   Type of Visit - Anticoagulation :   Telephone   Anticoagulation Indication :   Atrial fibrillation   Anticoagulant Duration :   Undetermined   Anticoagulation Medication Verified :   Yes   Alexa Phillips RN - 4/21/2021 9:45 AM CDT   Anticoagulation Patient Assessment Grid   Change in Alcohol Consumption :   No   Change in Diet :   No   Change in Medications :   No   Diarrhea :   No   Rectal Bleeding :   No   Signs of Clotting :   No   Signs of Warfarin Intolerance :   No   Unusual Bleeding, Bruising :   No   Upcoming Procedures :   No   Vomiting :   No   Alexa Phillips RN - 4/21/2021 9:45 AM CDT   Patient on Warfarin :   Yes   Alexa Phillips RN - 4/21/2021 9:45 AM CDT   Warfarin   Anticoagulant INR Goal Lower :   2    Anticoagulant INR Goal Upper :   3    Sunday :   2.5 mg   Monday :   5 mg   Tuesday :   2.5 mg   Wednesday :   5 mg   Thursday :   2.5 mg   Friday :   5 mg   Saturday :   2.5 mg   Total Dose :   25 mg   Warfarin Pt Reported Previous Week Dose :    Sun Mon Tues Wed Thurs Fri Sat Weekly Total Dose   Week 1 2.5 mg 5 mg 2.5 mg 5 mg 2.5 mg 5 mg 2.5 mg 25 mg   Week 2  mg  mg  mg  mg  mg  mg  mg  mg   Week 3  mg  mg  mg  mg  mg  mg  mg  mg   Week 4  mg  mg  mg  mg  mg  mg  mg  mg         Patient is taking single or multiple strength tablet(s) :   Single strength tab(s)   One Tab Strength :   5 mg tab   Sunday :   5 mg   Monday :   5 mg   Tuesday :   2.5 mg   Wednesday :   5 mg   Thursday :   2.5 mg   Friday :   5 mg   Saturday :   2.5 mg   Week 1 Total Dose :   27.5 mg   Sunday :   1 tab(s)   Monday :   1 tab(s)   Tuesday :   0.5 tab(s)   Wednesday :   1 tab(s)   Thursday :   0.5 tab(s)   Friday :   1 tab(s)   Saturday :   0.5 tab(s)   Warfarin Dosing Acknowledgement :   I have reviewed the patient's warfarin dosing schedule and  confirmed its accuracy for today's visit.   Patient Instructions :   Clinic POC INR = 1.9; per protocol Increase warfarin to 2.5 mg Tue/Thur/Sat and 5 mg ROW.  Recheck 2 weeks. LVM on personal phone.     Jacqueline OLVERA, Alexa YOUNG - 4/21/2021 9:45 AM CDT

## 2022-02-15 NOTE — NURSING NOTE
Anticoagulation Therapy Management Entered On:  11/17/2020 2:44 PM CST    Performed On:  11/17/2020 2:43 PM CST by Jie Aaron RN               Anticoagulation Visit Assessment   Type of Visit - Anticoagulation :   Face to face   Anticoagulation Medication Verified :   Yes   Jie Aaron RN - 11/17/2020 2:43 PM CST   Anticoagulation Patient Assessment Grid   Change in Alcohol Consumption :   No   Change in Diet :   No   Change in Medications :   No   Diarrhea :   No   Rectal Bleeding :   No   Signs of Clotting :   No   Signs of Warfarin Intolerance :   No   Unusual Bleeding, Bruising :   No   Upcoming Procedures :   No   Vomiting :   No   Jie Aaron RN - 11/17/2020 2:43 PM CST   Patient on Warfarin :   Yes   Jie Aaron RN - 11/17/2020 2:43 PM CST   Warfarin   Anticoagulant INR Goal Lower :   2    Anticoagulant INR Goal Upper :   3    Sunday :   2.5 mg   Monday :   5 mg   Tuesday :   2.5 mg   Wednesday :   2.5 mg   Thursday :   5 mg   Friday :   2.5 mg   Saturday :   2.5 mg   Total Dose :   22.5 mg   Warfarin Pt Reported Previous Week Dose :    Sun Mon Tues Wed Thurs Fri Sat Weekly Total Dose   Week 1 2.5 mg 5 mg 2.5 mg 2.5 mg 5 mg 2.5 mg 2.5 mg 22.5 mg   Week 2  mg  mg  mg  mg  mg  mg  mg  mg   Week 3  mg  mg  mg  mg  mg  mg  mg  mg   Week 4  mg  mg  mg  mg  mg  mg  mg  mg         Patient is taking single or multiple strength tablet(s) :   Single strength tab(s)   One Tab Strength :   5 mg tab   Sunday :   2.5 mg   Monday :   5 mg   Tuesday :   2.5 mg   Wednesday :   2.5 mg   Thursday :   5 mg   Friday :   2.5 mg   Saturday :   2.5 mg   Week 1 Total Dose :   22.5 mg   Sunday :   0.5 tab(s)   Monday :   1 tab(s)   Tuesday :   0.5 tab(s)   Wednesday :   0.5 tab(s)   Thursday :   1 tab(s)   Friday :   0.5 tab(s)   Saturday :   0.5 tab(s)   Patient Instructions :   [facility] P[facility] INR = 2.5  Continue warfarin 5mg Mon/ Thurs and 2.5 mg ROW  Recheck 1 week  Per protocol, Notified in office.     Dre Aaron RNa K -  11/17/2020 2:43 PM CST

## 2022-02-15 NOTE — NURSING NOTE
Anticoagulation Therapy Management Entered On:  2/26/2021 10:34 AM CST    Performed On:  2/26/2021 10:09 AM CST by Cristel Hernandez RN               Anticoagulation Visit Assessment   Type of Visit - Anticoagulation :   Telephone   Anticoagulant Duration :   Undetermined   Anticoagulation Medication Verified :   Yes   Cristel Hernandez RN - 2/26/2021 10:09 AM CST   Anticoagulation Patient Assessment Grid   Change in Alcohol Consumption :   No   Change in Diet :   No   Change in Medications :   Yes   (Comment: discontinued Flecainide on 1/26/21 [Cristel Hernandez RN - 2/26/2021 10:09 AM CST] )   Diarrhea :   No   Rectal Bleeding :   No   Signs of Clotting :   No   Signs of Warfarin Intolerance :   No   Unusual Bleeding, Bruising :   No   Upcoming Procedures :   No   Vomiting :   No   Cristel Hernandez RN - 2/26/2021 10:09 AM CST   Patient on Warfarin :   Yes   Patient on Other Anticoagulant :   No   Cristel Hernandez RN - 2/26/2021 10:09 AM CST   Warfarin   International Normalization Ratio TR :   2.5    Anticoagulant INR Goal Lower :   2    Anticoagulant INR Goal Upper :   3    Sunday :   2.5 mg   Monday :   5 mg   Tuesday :   2.5 mg   Wednesday :   2.5 mg   Thursday :   5 mg   Friday :   2.5 mg   Saturday :   2.5 mg   Total Dose :   22.5 mg   Warfarin Pt Reported Previous Week Dose :    Sun Mon Tues Wed Thurs Fri Sat Weekly Total Dose   Week 1 2.5 mg 5 mg 2.5 mg 2.5 mg 5 mg 2.5 mg 2.5 mg 22.5 mg   Week 2  mg  mg  mg  mg  mg  mg  mg  mg   Week 3  mg  mg  mg  mg  mg  mg  mg  mg   Week 4  mg  mg  mg  mg  mg  mg  mg  mg         Patient is taking single or multiple strength tablet(s) :   Single strength tab(s)   One Tab Strength :   5 mg tab   Sunday :   2.5 mg   Monday :   5 mg   Tuesday :   2.5 mg   Wednesday :   2.5 mg   Thursday :   5 mg   Friday :   2.5 mg   Saturday :   2.5 mg   Week 1 Total Dose :   22.5 mg   Sunday :   0.5 tab(s)   Monday :   1 tab(s)   Tuesday :   0.5 tab(s)   Wednesday :   0.5 tab(s)    Thursday :   1 tab(s)   Friday :   0.5 tab(s)   Saturday :   0.5 tab(s)   Warfarin Dosing Acknowledgement :   I have reviewed the patient's warfarin dosing schedule and confirmed its accuracy for today's visit.   Patient Instructions :   INR = 2.1 per [facility] P[facility] today. Patient is to stay on 5mg warfarin on Mon and Thurs and 2.5mg the rest of the days of the week. Recheck INR on 3/11/21 prior to cardiology fllow up. Patient advised via call to home.     Cristel Hernandez RN - 2/26/2021 10:09 AM CST   Medication History   Medication List   (As Of: 2/26/2021 10:34:39 AM CST)   Prescription/Discharge Order    warfarin  :   warfarin ; Status:   Prescribed ; Ordered As Mnemonic:   warfarin 5 mg oral tablet ; Simple Display Line:   5 mg, 1 tab(s), Oral, daily, 30 tab(s), 0 Refill(s) ; Ordering Provider:   Kirk Agarwal MD; Catalog Code:   warfarin ; Order Dt/Tm:   10/30/2020 9:47:39 AM CDT          alendronate  :   alendronate ; Status:   Prescribed ; Ordered As Mnemonic:   alendronate 35 mg oral tablet ; Simple Display Line:   35 mg, 1 tab(s), Oral, qweek, 12 tab(s), 3 Refill(s) ; Ordering Provider:   Kirk Agarwal MD; Catalog Code:   alendronate ; Order Dt/Tm:   8/4/2020 2:58:12 PM CDT          furosemide  :   furosemide ; Status:   Prescribed ; Ordered As Mnemonic:   furosemide 40 mg oral tablet ; Simple Display Line:   40 mg, 1 tab(s), Oral, daily, for 90 day(s), 90 tab(s), 1 Refill(s) ; Ordering Provider:   Kirk Agarwal MD; Catalog Code:   furosemide ; Order Dt/Tm:   5/4/2020 3:53:00 PM CDT            Home Meds    flecainide  :   flecainide ; Status:   Processing ; Ordered As Mnemonic:   flecainide 50 mg oral tablet ; Action Display:   Complete ; Catalog Code:   flecainide ; Order Dt/Tm:   2/26/2021 10:30:50 AM CST          multivitamin  :   multivitamin ; Status:   Documented ; Ordered As Mnemonic:   Daily Multiple Vitamins ; Simple Display Line:   Oral, daily, 0 Refill(s) ; Catalog Code:    multivitamin ; Order Dt/Tm:   2/10/2020 9:18:09 AM CST          metoprolol  :   metoprolol ; Status:   Documented ; Ordered As Mnemonic:   metoprolol succinate 50 mg oral capsule, extended release ; Simple Display Line:   25 mg, 0.5 cap(s), Oral, daily, 0 Refill(s) ; Catalog Code:   metoprolol ; Order Dt/Tm:   1/28/2019 2:09:51 PM CST

## 2022-02-15 NOTE — CARE COORDINATION
Patient:   PAULINA SANTANA            MRN: [id number]            FIN: [id number]               Age:   69 years     Sex:  Female     :  1950   Associated Diagnoses:   None   Author:   Karime Lowe CMA      Care Coordination Hospital Discharge Note    Sources of Information:  [ X  ] Patient, family member, or caregiver (Please list):Spoke with Paulina at 138pm.  She is doing well, no questions or concerns.  She verbalized understanding of her discharge instructions, f/u appointments and medications.  She also verbalized when to call or come in.  [  X ] Hospital discharge summary  [   ] Hospital fax  [   ] List of recent hospitalizations or ED visits  [   ] Other:   Last Attending: humberto castellano MD    Discharged From: [facility]  Admission Date: 2020  Discharge Date: 2020  Discharge Plan: Discharged to home  Diagnosis/Problem: Pleural effusion on right    Medication Changes: [   ] Yes [X   ] No   Medication List Updated: [   ] Yes [  X ] No: Hospital Med List at Discharge Reconciled with Current Med List   Medications          *denotes recorded medication          *Tylenol Extra Strength: Oral, q6 hrs, 0 Refill(s).          *Senna Plus 50 mg-8.6 mg oral tablet: 2 tab(s), Oral, hs, 0 Refill(s).          *enoxaparin 60 mg/0.6 mL injectable solution: Subcutaneous, q12 hrs, 0 Refill(s).          *furosemide 40 mg oral tablet: 40 mg, 1 tab(s), Oral, daily, 0 Refill(s).          *metoprolol succinate 50 mg oral capsule, extended release: 50 mg, 1 cap(s), Oral, daily, 0 Refill(s).          *Daily Multiple Vitamins: Oral, daily, 0 Refill(s).          *oxyCODONE 5 mg oral tablet: 5 mg, 1 tab(s), Oral, q4 hrs, PRN: for pain, 0 Refill(s).      Needs Referral or Lab: [   ] Yes [ X  ] No  Outpatient Provider Recommendations: F/u with oncologist as outpatient  Needs Follow-up Appointment:  [X ] Within 1-5 days of discharge (highly complex visit)  [   ] Within 14 days of discharge (moderately complex  visit)    Appointment Made With: Oncology 02/18/2020 and Cardio 02/13/2020 and was transferred to appt to schedule with GTG.  Date:    Additional Information Needed and Requested:  [   ] Yes:  [ X  ] No

## 2022-02-15 NOTE — NURSING NOTE
Comprehensive Intake Entered On:  12/26/2019 9:01 AM CST    Performed On:  12/26/2019 8:52 AM CST by Camelia Agarwal CMA               Summary   Chief Complaint :   c/o cough and SOB. Sx present for a month.    Weight Measured :   128.2 lb(Converted to: 128 lb 3 oz, 58.15 kg)    Systolic Blood Pressure :   106 mmHg   Diastolic Blood Pressure :   58 mmHg (LOW)    Mean Arterial Pressure :   74 mmHg   Peripheral Pulse Rate :   83 bpm   BP Site :   Right arm   BP Method :   Manual   HR Method :   Electronic   Temperature Tympanic :   98.8 DegF(Converted to: 37.1 DegC)    Respiratory Rate :   22 br/min (HI)    Oxygen Saturation :   99 %   Camelia Agarwal CMA - 12/26/2019 8:52 AM CST   Health Status   Allergies Verified? :   Yes   Medication History Verified? :   Yes   Pre-Visit Planning Status :   N/A   Tobacco Use? :   Never smoker   Camelia Agarwal CMA - 12/26/2019 8:52 AM CST   Consents   Consent for Immunization Exchange :   Consent Granted   Consent for Immunizations to Providers :   Consent Granted   Camelia Agarwal CMA - 12/26/2019 8:52 AM CST   Meds / Allergies   (As Of: 12/26/2019 9:01:24 AM CST)   Allergies (Active)   penicillin  Estimated Onset Date:   Unspecified ; Reactions:   bumps on skin ; Created By:   Zahraa Castillo; Reaction Status:   Active ; Category:   Drug ; Substance:   penicillin ; Type:   Allergy ; Updated By:   Zahraa Castillo; Source:   Patient ; Reviewed Date:   4/18/2019 2:06 PM CDT        Medication List   (As Of: 12/26/2019 9:01:24 AM CST)   Home Meds    metoprolol  :   metoprolol ; Status:   Documented ; Ordered As Mnemonic:   metoprolol succinate 50 mg oral capsule, extended release ; Simple Display Line:   50 mg, 1 cap(s), Oral, daily, 0 Refill(s) ; Catalog Code:   metoprolol ; Order Dt/Tm:   1/28/2019 2:09:51 PM CST          warfarin  :   warfarin ; Status:   Documented ; Ordered As Mnemonic:   warfarin 5 mg oral tablet ; Simple Display Line:   5 mg, 1 tab(s), Oral, daily, 0 Refill(s) ; Catalog  Code:   warfarin ; Order Dt/Tm:   1/28/2019 2:10:03 PM CST

## 2022-02-15 NOTE — LETTER
(Inserted Image. Unable to display)     March 29, 2019      MARNI SANTANA  [address] [place] [id number]          Dear MARNI,      Thank you for selecting [facility] (previously [facility] [place] [facility]) for your healthcare needs.      Our records indicate you are due for the following services:     Clinical Support Staff (CSS)-Only Blood Pressure Check ~ Please stop in anytime to have your blood pressure rechecked. This is a free service and no appointment necessary.     So we can best determine if your medications are effective in lowering your blood pressure, please make sure your blood pressure medicine has been in your system for at least 1-2 hours prior to coming in.  We encourage you to avoid caffeine or other stimulants prior to having your blood pressure checked and come at a time when you are not feeling rushed.     If you check your blood pressure at home, please bring in your blood pressure monitor and home blood pressure readings.  We will check your machine for accuracy and also share your home readings with your Healthcare Provider.       To schedule an appointment or if you have further questions, please contact your primary clinic:   [facility]       [phone]   [facility]       [phone]              [facility]     [phone]      Powered by [employer]    Sincerely,    Kirk Agarwal MD

## 2022-02-15 NOTE — NURSING NOTE
Comprehensive Intake Entered On:  5/4/2020 3:15 PM CDT    Performed On:  5/4/2020 3:09 PM CDT by Leona MORALES, Nan               Summary   Chief Complaint :   verbal consent given for telemed visit.  discuss getting Lasix refilled, was previously getting from cardio but now wants primary to fill rx.   Nan Delgadillo MA - 5/4/2020 3:09 PM CDT   Health Status   Allergies Verified? :   Yes   Medication History Verified? :   Yes   Medical History Verified? :   Yes   Pre-Visit Planning Status :   Not completed   Tobacco Use? :   Never smoker   Nan Delgadillo MA - 5/4/2020 3:09 PM CDT   Consents   Consent for Immunization Exchange :   Consent Granted   Consent for Immunizations to Providers :   Consent Granted   Nan Delgadillo MA - 5/4/2020 3:09 PM CDT   Meds / Allergies   (As Of: 5/4/2020 3:15:34 PM CDT)   Allergies (Active)   penicillin  Estimated Onset Date:   Unspecified ; Reactions:   bumps on skin ; Created By:   Zahraa Castillo; Reaction Status:   Active ; Category:   Drug ; Substance:   penicillin ; Type:   Allergy ; Updated By:   Zahraa Castillo; Source:   Patient ; Reviewed Date:   2/7/2020 2:11 PM CST        Medication List   (As Of: 5/4/2020 3:15:34 PM CDT)   Home Meds    acetaminophen  :   acetaminophen ; Status:   Completed ; Ordered As Mnemonic:   Tylenol Extra Strength ; Simple Display Line:   Oral, q6 hrs, 0 Refill(s) ; Catalog Code:   acetaminophen ; Order Dt/Tm:   2/7/2020 2:05:47 PM CST          docusate-senna  :   docusate-senna ; Status:   Completed ; Ordered As Mnemonic:   Senna Plus 50 mg-8.6 mg oral tablet ; Simple Display Line:   2 tab(s), Oral, hs, 0 Refill(s) ; Catalog Code:   docusate-senna ; Order Dt/Tm:   2/10/2020 9:18:56 AM CST          enoxaparin  :   enoxaparin ; Status:   Documented ; Ordered As Mnemonic:   enoxaparin 60 mg/0.6 mL injectable solution ; Simple Display Line:   Subcutaneous, q12 hrs, 0 Refill(s) ; Catalog Code:   enoxaparin ; Order Dt/Tm:   2/7/2020 2:05:05 PM CST           furosemide  :   furosemide ; Status:   Documented ; Ordered As Mnemonic:   furosemide 40 mg oral tablet ; Simple Display Line:   40 mg, 1 tab(s), Oral, daily, 0 Refill(s) ; Catalog Code:   furosemide ; Order Dt/Tm:   12/31/2019 10:26:55 AM CST          metoprolol  :   metoprolol ; Status:   Documented ; Ordered As Mnemonic:   metoprolol succinate 50 mg oral capsule, extended release ; Simple Display Line:   25 mg, 0.5 cap(s), Oral, daily, 0 Refill(s) ; Catalog Code:   metoprolol ; Order Dt/Tm:   1/28/2019 2:09:51 PM CST          multivitamin  :   multivitamin ; Status:   Documented ; Ordered As Mnemonic:   Daily Multiple Vitamins ; Simple Display Line:   Oral, daily, 0 Refill(s) ; Catalog Code:   multivitamin ; Order Dt/Tm:   2/10/2020 9:18:09 AM CST          oxyCODONE  :   oxyCODONE ; Status:   Completed ; Ordered As Mnemonic:   oxyCODONE 5 mg oral tablet ; Simple Display Line:   5 mg, 1 tab(s), Oral, q4 hrs, PRN: for pain, 0 Refill(s) ; Catalog Code:   oxyCODONE ; Order Dt/Tm:   2/10/2020 9:18:32 AM CST            ID Risk Screen   Recent Travel History :   No recent travel   Family Member Travel History :   No recent travel   Other Exposure to Infectious Disease :   Unknown   Nan Delgadillo MA - 5/4/2020 3:09 PM CDT   Social History   Social History   (As Of: 5/4/2020 3:15:35 PM CDT)   Tobacco:        Never (less than 100 in lifetime)   (Last Updated: 1/28/2019 2:11:09 PM CST by Nan Delgadillo MA)          Substance Abuse:        Never   (Last Updated: 1/28/2019 3:47:03 PM CST by Nna Delgadillo MA)          Employment/School:        Employed, 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   (Last Updated: 1/28/2019 3:47:30 PM CST by Nan Delgadillo MA)          Home/Environment:        Marital status: .  Lives with Self.   (Last Updated: 1/28/2019 3:47:41 PM CST by Nan Delgadillo MA)          Exercise:        Exercise frequency: unknown frequency.   (Last Updated: 1/28/2019 3:48:10 PM CST by Leona  Nan MORALES)

## 2022-02-15 NOTE — NURSING NOTE
Hearing and Vision Screening Entered On:  8/4/2020 2:08 PM CDT    Performed On:  8/4/2020 2:07 PM CDT by Nan Delgadillo MA               Hearing and Vision Screening   Audiogram Result Right Ear :   Pass   Audiogram Result Left Ear :   Pass   Nan Delgadillo MA - 8/4/2020 2:07 PM CDT

## 2022-02-15 NOTE — PROGRESS NOTES
Chief Complaint    c/o cough and SOB. Sx present for a month.  History of Present Illness      Patient is a 69-year-old female who complains of breathing issues for about a month.      She has no history of breathing issues.  No history of asthma.      She has had a hard cough for about a month.  It is occasionally productive.  She notices that it comes on when she is either bent over or lying flat.  She also notices that she gets out of breath for seemingly no reason.  For instance just walking into the clinic and talking with the nurse made her a little short of breath.      Overall she feels her cough is improving.      She has noticed no leg swelling.       She has had no fever or chills.       Mild headache and mild sore throat especially at the beginning.       Early on she occasionally did have some chest pain or chest discomfort when she would lie flat.        Her past medical history includes atrial fibrillation and hypertension.  She is on rate control medication and warfarin.  She had an INR done 3 days ago and it was slightly elevated.        Family history positive for CHF in her dad.           Review of Systems      Negative except as listed in HPI.  Physical Exam   Vitals & Measurements    T: 98.8   F (Tympanic)  HR: 83(Peripheral)  RR: 22  BP: 106/58  SpO2: 99%     WT: 128.2 lb       Vitals noted and normal.      Patient is alert, oriented and in no acute distress.      Mouth: mucous membranes pink and moist, pharynx not erythematous      Neck is supple with no lymphadenopathy      Heart: regular rate and rhythm with no murmur      Lungs: clear to auscultation bilaterally with decreased breath sounds at the right base.      Radial pulses normal bilaterally      no pedal edema      BNP elevated      BMP and CBC normal      troponin normal      CXR with large pleural effusion on the right      EKG normal  Assessment/Plan       Congestive heart failure (CHF) (I50.9)         Ordered:          29809 ecg  routine ecg w/least 12 lds w/i+r (Charge), Quantity: 1, Congestive heart failure (CHF)                Cough (R05)         Ordered:          B-type Natriuretic Peptide (Request), Priority: Urgent, Cough  Dyspnea on effort          Basic Metabolic Panel (Request), Priority: Urgent, Cough  Dyspnea on effort          CBC w/ Diff/Plt (Request), Priority: Urgent, Cough  Dyspnea on effort          Troponin-I (Request), Priority: Urgent, Cough  Dyspnea on effort          XR Chest PA/Lat (Request), Cough  Dyspnea on effort                Dyspnea on effort (R06.09)         Ordered:          B-type Natriuretic Peptide (Request), Priority: Urgent, Cough  Dyspnea on effort          Basic Metabolic Panel (Request), Priority: Urgent, Cough  Dyspnea on effort          CBC w/ Diff/Plt (Request), Priority: Urgent, Cough  Dyspnea on effort          Troponin-I (Request), Priority: Urgent, Cough  Dyspnea on effort          XR Chest PA/Lat (Request), Cough  Dyspnea on effort                Pleural effusion, right (J90)         Direct admit to [facility]           Patient Information     Name:MARNI SANTANA      Address:      [address] [place] [id number]     Sex:Female     YOB: 1950     [phone]     Emergency Contact:ABBIE MENDEZ     MRN:[id number]     FIN:[id number]     Location:[facility]     Date of Service:12/26/2019      Primary Care Physician:       Kirk Agarwal MD, [phone]      Attending Physician:       Tanya Mcgrath MD, [phone]  Problem List/Past Medical History    Ongoing     Atrial fibrillation     Hypertension     Warfarin anticoagulation    Historical     No qualifying data  Procedure/Surgical History     Breast implant        Medications    metoprolol succinate 50 mg oral capsule, extended release, 50 mg= 1 cap(s), Oral, daily    warfarin 5 mg oral tablet, 5 mg= 1 tab(s), Oral, daily  Allergies    penicillin (bumps on skin)  Social  History    Smoking Status - 12/26/2019     Never smoker     Employment/School      Employed, 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 Lives with Self., 01/28/2019     Substance Abuse      Never, 01/28/2019     Tobacco      Never (less than 100 in lifetime), 01/28/2019  Family History    Atrial fibrillation: Brother.    HTN - Hypertension: Father.  Lab Results       Lab Results (Last 4 results within 90 days)        Sodium Level: 141 [135 mmol/L - 145 mmol/L] (12/26/19 09:30:00)       Potassium Level: 4.4 [3.5 mmol/L - 5 mmol/L] (12/26/19 09:30:00)       Chloride Level: 107 [98 mmol/L - 110 mmol/L] (12/26/19 09:30:00)       CO2 Level: 26 [21 mmol/L - 31 mmol/L] (12/26/19 09:30:00)       AGAP: 8 [5  - 18] (12/26/19 09:30:00)       Glucose Level: 90 [65 mg/dL - 100 mg/dL] (12/26/19 09:30:00)       BUN: 12 [8 mg/dL - 25 mg/dL] (12/26/19 09:30:00)       Creatinine Level: 0.76 [0.57 mg/dL - 1.11 mg/dL] (12/26/19 09:30:00)       BUN/Creat Ratio: 16 [10  - 20] (12/26/19 09:30:00)       eGFR : >60 (12/26/19 09:30:00)       eGFR Non-: >60 (12/26/19 09:30:00)       Calcium Level: 9.1 [8.5 mg/dL - 10.5 mg/dL] (12/26/19 09:30:00)       Troponin I: <0.010 (12/26/19 09:30:00)       B-Natriuretic Peptide (BNP): 246 High (12/26/19 09:30:00)       WBC: 6.8 [4.5  - 11] (12/26/19 09:30:00)       RBC: 4.47 [4  - 5.2] (12/26/19 09:30:00)       Hgb: 13.2 [12 g/dL - 16 g/dL] (12/26/19 09:30:00)       Hct: 40.9 [33 % - 51 %] (12/26/19 09:30:00)       MCV: 92 [80 fL - 100 fL] (12/26/19 09:30:00)       MCH: 29.5 [26 pg - 34 pg] (12/26/19 09:30:00)       MCHC: 32.3 [32 g/dL - 36 g/dL] (12/26/19 09:30:00)       RDW: 13.8 [11.5 % - 15.5 %] (12/26/19 09:30:00)       Platelet: 270 [140  - 440] (12/26/19 09:30:00)       MPV: 9.3 [6.5 fL - 11 fL] (12/26/19 09:30:00)       Lymphocytes:  20.4 (12/26/19 09:30:00)       Abs Lymphocytes: 1.4 [0.9  - 2.9] (12/26/19 09:30:00)       Neutrophils: 68.6 (12/26/19 09:30:00)       Abs Neutrophils: 4.7 [1.7  - 7] (12/26/19 09:30:00)       Monocytes: 7.9 (12/26/19 09:30:00)       Abs Monocytes: 0.5 (12/26/19 09:30:00)       Eosinophils: 2.8 (12/26/19 09:30:00)       Abs Eosinophils: 0.2 (12/26/19 09:30:00)       Basophils: 0.3 (12/26/19 09:30:00)       Abs Basophils: 0.0 (12/26/19 09:30:00)       INR: 3.6 (12/23/19 14:10:00)       INR: 2.5 (11/26/19 14:08:00)       INR: 2 (10/31/19 09:12:00)       INR: 1.8 (10/17/19 08:51:00)

## 2022-02-15 NOTE — NURSING NOTE
Quick Intake Entered On:  11/26/2019 2:12 PM CST    Performed On:  11/26/2019 2:12 PM CST by Cristel Hernandez RN               Summary   Systolic Blood Pressure :   112 mmHg   Diastolic Blood Pressure :   80 mmHg   Mean Arterial Pressure :   91 mmHg   Cristel Hernandez RN - 11/26/2019 2:12 PM CST

## 2022-02-15 NOTE — TELEPHONE ENCOUNTER
---------------------  From: Alexa Phillips RN   To: INR Pool ( [id number]_[facility]);     Cc: GTG Message Pool ([id number]_[place] [facility] [place]);      Sent: 12/10/2020 9:05:01 AM CST  Subject: Ablation 12/21/2020     Pt is having ablation with Dr. Child at [place] on 12/21/20.  I called his nurse, Yanet, at [phone] and [facility] asking if she needs any previous INR's faxed or next week's INR faxed.    Waiting for return call.---------------------  From: Alison Waldron LPN (GTG Message Pool ([id number]_[facility]))   To: Kirk Agarwal MD;     Sent: 12/10/2020 10:40:31 AM CST  Subject: FW: Ablation 12/21/2020VM received from Yanet, nurse with Dr. Child, at 1204    Today and next week's INR's can be faxed to: [phone] Attn: Dr. Child.  Goal INR = 2 - 2.5  *Do Not Hold Any Warfarin Doses    Will fax today's INR.notedToday's INR results faxed.

## 2022-02-15 NOTE — NURSING NOTE
Placed LifeWatch holter @ 1548 per GTG for dx of A fib. Gave patient written & verbal instructions and sent patient home with all LifeWatch supplies. Patient indicated she understood. End of service date is 2/25/19.Printed Holter Monitor Report and ACT Event Monitor 2/22/19 to 2/23/19 and routed to [facility] to interpret reports.

## 2022-02-15 NOTE — NURSING NOTE
Comprehensive Intake Entered On:  2/13/2020 12:59 PM CST    Performed On:  2/13/2020 12:51 PM CST by Leona MORALES, Nan               Summary   Chief Complaint :   post hospital follow up--ovarian cancer, pneumonia (?), DVT.  was put on metoprolol, Lasix x30 days, Lovenox, no longer taking Warfarin.  has cardio appt w/ cardio next Tuesday.   Weight Measured :   117.6 lb(Converted to: 117 lb 10 oz, 53.34 kg)    Height Measured :   62 in(Converted to: 5 ft 2 in, 157.48 cm)    Body Mass Index :   21.51 kg/m2   Body Surface Area :   1.53 m2   Systolic Blood Pressure :   98 mmHg   Diastolic Blood Pressure :   60 mmHg   Mean Arterial Pressure :   73 mmHg   Peripheral Pulse Rate :   79 bpm   BP Site :   Right arm   Pulse Site :   Radial artery   BP Method :   Manual   HR Method :   Manual   Temperature Tympanic :   99.4 DegF(Converted to: 37.4 DegC)    Oxygen Saturation :   96 %   Nan Delgadillo MA - 2/13/2020 12:51 PM CST   Health Status   Allergies Verified? :   Yes   Medication History Verified? :   Yes   Medical History Verified? :   Yes   Pre-Visit Planning Status :   Completed   Tobacco Use? :   Never smoker   Nan Delgadillo MA - 2/13/2020 12:51 PM CST   Consents   Consent for Immunization Exchange :   Consent Granted   Consent for Immunizations to Providers :   Consent Granted   Nan Delgadillo MA - 2/13/2020 12:51 PM CST   Meds / Allergies   (As Of: 2/13/2020 1:00:00 PM CST)   Allergies (Active)   penicillin  Estimated Onset Date:   Unspecified ; Reactions:   bumps on skin ; Created By:   Zahraa Castillo; Reaction Status:   Active ; Category:   Drug ; Substance:   penicillin ; Type:   Allergy ; Updated By:   Zahraa Castillo; Source:   Patient ; Reviewed Date:   2/7/2020 2:11 PM CST        Medication List   (As Of: 2/13/2020 1:00:00 PM CST)   Home Meds    acetaminophen  :   acetaminophen ; Status:   Documented ; Ordered As Mnemonic:   Tylenol Extra Strength ; Simple Display Line:   Oral, q6 hrs, 0 Refill(s) ;  Catalog Code:   acetaminophen ; Order Dt/Tm:   2/7/2020 2:05:47 PM CST          docusate-senna  :   docusate-senna ; Status:   Documented ; Ordered As Mnemonic:   Senna Plus 50 mg-8.6 mg oral tablet ; Simple Display Line:   2 tab(s), Oral, hs, 0 Refill(s) ; Catalog Code:   docusate-senna ; Order Dt/Tm:   2/10/2020 9:18:56 AM CST          enoxaparin  :   enoxaparin ; Status:   Documented ; Ordered As Mnemonic:   enoxaparin 60 mg/0.6 mL injectable solution ; Simple Display Line:   Subcutaneous, q12 hrs, 0 Refill(s) ; Catalog Code:   enoxaparin ; Order Dt/Tm:   2/7/2020 2:05:05 PM CST          furosemide  :   furosemide ; Status:   Documented ; Ordered As Mnemonic:   furosemide 40 mg oral tablet ; Simple Display Line:   40 mg, 1 tab(s), Oral, daily, 0 Refill(s) ; Catalog Code:   furosemide ; Order Dt/Tm:   12/31/2019 10:26:55 AM CST          metoprolol  :   metoprolol ; Status:   Documented ; Ordered As Mnemonic:   metoprolol succinate 50 mg oral capsule, extended release ; Simple Display Line:   50 mg, 1 cap(s), Oral, daily, 0 Refill(s) ; Catalog Code:   metoprolol ; Order Dt/Tm:   1/28/2019 2:09:51 PM CST          multivitamin  :   multivitamin ; Status:   Documented ; Ordered As Mnemonic:   Daily Multiple Vitamins ; Simple Display Line:   Oral, daily, 0 Refill(s) ; Catalog Code:   multivitamin ; Order Dt/Tm:   2/10/2020 9:18:09 AM CST          oxyCODONE  :   oxyCODONE ; Status:   Documented ; Ordered As Mnemonic:   oxyCODONE 5 mg oral tablet ; Simple Display Line:   5 mg, 1 tab(s), Oral, q4 hrs, PRN: for pain, 0 Refill(s) ; Catalog Code:   oxyCODONE ; Order Dt/Tm:   2/10/2020 9:18:32 AM CST            Social History   Social History   (As Of: 2/13/2020 1:00:00 PM CST)   Tobacco:        Never (less than 100 in lifetime)   (Last Updated: 1/28/2019 2:11:09 PM CST by Nan Delgadillo MA)          Substance Abuse:        Never   (Last Updated: 1/28/2019 3:47:03 PM CST by Nan Delgadillo MA)          Employment/School:         Employed, 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   (Last Updated: 1/28/2019 3:47:30 PM CST by Nan Delgadillo MA)          Home/Environment:        Marital status: .  Lives with Self.   (Last Updated: 1/28/2019 3:47:41 PM CST by Nan Delgadillo MA)          Exercise:        Exercise frequency: unknown frequency.   (Last Updated: 1/28/2019 3:48:10 PM CST by Nan Delgadillo MA)

## 2022-02-15 NOTE — RESULTS
(Inserted Image. Unable to display)          (Inserted Image. Unable to display)     [address] [place], [place] [address]  Phone [phone]  Fax [phone]  HOLTER MONITOR REPORT    MARNI SANTANA : 1950  Date of Exam:  2019  MRN: [id number]   Ordering HCP:  Kirk Agarwal MD          INDICATION:  Atrial fibrillation.     FINDINGS: The Holter monitor recording period was 23 hours and 49 minutes of which 22 hours and 45 minutes could be analyzed.     The rhythm is sinus.  Average rate of 68.  Minimum rate of 50.  Maximum rate of 96.  No pauses.                     Ventricular ectopics number 6.  No runs.        Supraventricular ectopics number 33 including 3 runs, the longest of 4 beats at a rate of 153.      No atrial fibrillation.      No patient symptom events.         IMPRESSION:  Unremarkable Holter monitor.         Ugo Campbell MD, FACP  Interpreting HCP                    CELSA/marvin   D:  2019                                                                          T:  2019    HOLTER MONITOR REPORT

## 2022-02-15 NOTE — NURSING NOTE
Quick Intake Entered On:  4/4/2019 1:50 PM CDT    Performed On:  4/4/2019 1:50 PM CDT by Alexa Phillips RN               Summary   Chief Complaint :   BP   Systolic Blood Pressure :   136 mmHg (HI)    Diastolic Blood Pressure :   88 mmHg (HI)    Mean Arterial Pressure :   104 mmHg   BP Site :   Right arm   BP Method :   Manual   Alexa Phillips RN - 4/4/2019 1:50 PM CDT

## 2022-02-15 NOTE — RESULTS
Anticoagulation Therapy Entered On:  10/31/2019 9:13 AM CDT    Performed On:  10/31/2019 9:12 AM CDT by Alexa Phillips RN               Warfarin Management   Week 1 Sunday Dose :   2.5    Week 1 Monday Dose :   2.5    Week 1 Tuesday Dose :   2.5    Week 1 Wednesday Dose :   2.5    Week 1 Thursday Dose :   5    Week 1 Friday Dose :   2.5    Week 1 Saturday Dose :   5    Week 1 Dose Total :   22.5    Week 2 Sunday Dose :   2.5    Week 2 Monday Dose :   2.5    Week 2 Tuesday Dose :   2.5    Week 2 Wednesday Dose :   2.5    Week 2 Thursday Dose :   5    Week 2 Friday Dose :   2.5    Week 2 Saturday Dose :   5    Week 2 Dose Total :   22.5    Planned Duration :   Indefinite   Indication :   Atrial fibrillation   Warfarin Management Comments :   Lab test performed by:  [facility] Office  [address] [place] [address]  Phone # [phone]  Fax# [phone]  Capillary   International Normalization Ratio :   2.0    INR Range :   2.0 - 3.0   INR Therapeutic Range :   Yes   Alexa Phillips RN - 10/31/2019 9:12 AM CDT   Warfarin Management and Results Grid   Signs of Thrombolic :   No   Signs of Warfarin Intolerance :   No   Changes in Diet or Alcohol Intake :   No   Changes in Medication or Antibiotics :   No   Unusual Bleeding or Bruising :   No   Rectal Bleeding or Black Stools :   No   Vitamin K Food Handout :   No   Heart Valve Replacement :   No   Alexa Phillips RN - 10/31/2019 9:12 AM CDT   Anticoagulation Recheck :   4 weeks   Warfarin Special Instructions :   Per prototocl continue warfarin 5 mg Thur/Sat and 2.5 mg ROW; recheck INR in 4 weeks; pt notified in office.   Alexa Phillips RN - 10/31/2019 9:12 AM CDT

## 2022-02-15 NOTE — RESULTS
Anticoagulation Therapy Entered On:  2/8/2019 10:28 AM CST    Performed On:  2/8/2019 9:06 AM CST by Alexa Phillips RN               Warfarin Management   Week 1 Sunday Dose :   2.5    Week 1 Monday Dose :   2.5    Week 1 Tuesday Dose :   2.5    Week 1 Wednesday Dose :   2.5    Week 1 Thursday Dose :   2.5    Week 1 Friday Dose :   2.5    Week 1 Saturday Dose :   2.5    Week 1 Dose Total :   17.5    Week 2 Sunday Dose :   2.5    Week 2 Monday Dose :   2.5    Week 2 Tuesday Dose :   2.5    Week 2 Wednesday Dose :   2.5    Week 2 Thursday Dose :   2.5    Week 2 Friday Dose :   2.5    Week 2 Saturday Dose :   2.5    Week 2 Dose Total :   17.5    Indication :   Atrial fibrillation   Warfarin Management Comments :   Lab test performed by:  [facility]  [facility] [place] [address]  Phone # [phone]  Fax# [phone]  Capillary   International Normalization Ratio :   2.6    INR Range :   2.0 - 3.0   INR Therapeutic Range :   Yes   Alexa Phillips RN - 2/8/2019 10:25 AM CST   Warfarin Management and Results Grid   Signs of Thrombolic :   No   Signs of Warfarin Intolerance :   No   Changes in Diet or Alcohol Intake :   No   Changes in Medication or Antibiotics :   No   Unusual Bleeding or Bruising :   No   Rectal Bleeding or Black Stools :   No   Vitamin K Food Handout :   No   Heart Valve Replacement :   No   Alexa Phillips RN - 2/8/2019 10:25 AM CST   Anticoagulation Recheck :   2 weeks   Warfarin Special Instructions :   Per TFS continue warfarin 2.5 mg daily; recheck 2 weeks; LVM on personal phone per request.   Alexa Phillips RN - 2/8/2019 10:25 AM CST

## 2022-02-15 NOTE — NURSING NOTE
Anticoagulation Therapy Entered On:  8/30/2019 2:53 PM CDT    Performed On:  8/30/2019 2:52 PM CDT by Alexa Phillips RN               Warfarin Management   Week 1 Sunday Dose :   2.5    Week 1 Monday Dose :   2.5    Week 1 Tuesday Dose :   2.5    Week 1 Wednesday Dose :   2.5    Week 1 Thursday Dose :   5    Week 1 Friday Dose :   2.5    Week 1 Saturday Dose :   5    Week 1 Dose Total :   22.5    Week 2 Sunday Dose :   2.5    Week 2 Monday Dose :   2.5    Week 2 Tuesday Dose :   2.5    Week 2 Wednesday Dose :   2.5    Week 2 Thursday Dose :   5    Week 2 Friday Dose :   2.5    Week 2 Saturday Dose :   5    Week 2 Dose Total :   22.5    Planned Duration :   Indefinite   Indication :   Atrial fibrillation   Warfarin Management Comments :   Lab test performed by:  [facility]  [address] [place], [place] [address]  Phone # [phone]  Fax# [phone]   INR Range :   2.0 - 3.0   INR Therapeutic Range :   No   Warfarin Abnormal Findings :   Patient stopped warfarin on own approximately 5 days ago due to having a itchy rash on chest   Anticoagulation Recheck :   1 week   Warfarin Special Instructions :   Urgent venous INR = 1.1; per MJP resume warfarin 5 mg Thur/Sat and 2.5 mg ROW; recheck 1 week and make appt with PCP to discuss anticoagulation options if necessary; pt notified by detailed VM on personal phone.   Alexa Phillips RN - 8/30/2019 2:52 PM CDT

## 2024-12-16 NOTE — TELEPHONE ENCOUNTER
---------------------  From: Alexa Phillips RN   Sent: 10/20/2021 12:13:15 PM CDT  Subject: INR reminder     Patient is 14 days past due for INR.  First letter has been printed from [employer] and sent to [place] for scanning and mailing.   no